# Patient Record
Sex: FEMALE | Race: WHITE | NOT HISPANIC OR LATINO | Employment: PART TIME | URBAN - METROPOLITAN AREA
[De-identification: names, ages, dates, MRNs, and addresses within clinical notes are randomized per-mention and may not be internally consistent; named-entity substitution may affect disease eponyms.]

---

## 2022-12-20 ENCOUNTER — APPOINTMENT (OUTPATIENT)
Dept: RADIOLOGY | Facility: MEDICAL CENTER | Age: 57
DRG: 854 | End: 2022-12-20
Attending: UROLOGY
Payer: OTHER MISCELLANEOUS

## 2022-12-20 ENCOUNTER — HOSPITAL ENCOUNTER (OUTPATIENT)
Dept: RADIOLOGY | Facility: MEDICAL CENTER | Age: 57
End: 2022-12-20

## 2022-12-20 ENCOUNTER — ANESTHESIA (OUTPATIENT)
Dept: SURGERY | Facility: MEDICAL CENTER | Age: 57
DRG: 854 | End: 2022-12-20
Payer: OTHER MISCELLANEOUS

## 2022-12-20 ENCOUNTER — APPOINTMENT (OUTPATIENT)
Dept: RADIOLOGY | Facility: MEDICAL CENTER | Age: 57
DRG: 854 | End: 2022-12-20
Attending: EMERGENCY MEDICINE
Payer: OTHER MISCELLANEOUS

## 2022-12-20 ENCOUNTER — HOSPITAL ENCOUNTER (INPATIENT)
Facility: MEDICAL CENTER | Age: 57
LOS: 1 days | DRG: 854 | End: 2022-12-21
Attending: EMERGENCY MEDICINE | Admitting: STUDENT IN AN ORGANIZED HEALTH CARE EDUCATION/TRAINING PROGRAM
Payer: OTHER MISCELLANEOUS

## 2022-12-20 DIAGNOSIS — N20.0 KIDNEY STONE: ICD-10-CM

## 2022-12-20 DIAGNOSIS — Z95.2 H/O MECHANICAL AORTIC VALVE REPLACEMENT: ICD-10-CM

## 2022-12-20 PROBLEM — N12 PYELONEPHRITIS: Status: ACTIVE | Noted: 2022-12-20

## 2022-12-20 PROBLEM — A41.9 SEPSIS (HCC): Status: RESOLVED | Noted: 2022-12-20 | Resolved: 2022-12-20

## 2022-12-20 PROBLEM — S37.19XA: Status: ACTIVE | Noted: 2022-12-20

## 2022-12-20 PROBLEM — D72.829 LEUKOCYTOSIS: Status: ACTIVE | Noted: 2022-12-20

## 2022-12-20 PROBLEM — N17.9 AKI (ACUTE KIDNEY INJURY) (HCC): Status: ACTIVE | Noted: 2022-12-20

## 2022-12-20 PROBLEM — N13.9 OBSTRUCTIVE UROPATHY: Status: ACTIVE | Noted: 2022-12-20

## 2022-12-20 PROBLEM — I50.9 CONGESTIVE HEART FAILURE (CHF) (HCC): Status: ACTIVE | Noted: 2022-12-20

## 2022-12-20 PROBLEM — A41.9 SEPSIS (HCC): Status: ACTIVE | Noted: 2022-12-20

## 2022-12-20 LAB
ALBUMIN SERPL BCP-MCNC: 4.2 G/DL (ref 3.2–4.9)
ALBUMIN/GLOB SERPL: 1.8 G/DL
ALP SERPL-CCNC: 69 U/L (ref 30–99)
ALT SERPL-CCNC: 18 U/L (ref 2–50)
ANION GAP SERPL CALC-SCNC: 12 MMOL/L (ref 7–16)
APPEARANCE UR: CLEAR
APTT PPP: 160.1 SEC (ref 24.7–36)
APTT PPP: 26.2 SEC (ref 24.7–36)
AST SERPL-CCNC: 23 U/L (ref 12–45)
BACTERIA #/AREA URNS HPF: NEGATIVE /HPF
BASOPHILS # BLD AUTO: 0.2 % (ref 0–1.8)
BASOPHILS # BLD: 0.04 K/UL (ref 0–0.12)
BILIRUB SERPL-MCNC: 0.4 MG/DL (ref 0.1–1.5)
BILIRUB UR QL STRIP.AUTO: NEGATIVE
BUN SERPL-MCNC: 25 MG/DL (ref 8–22)
CALCIUM ALBUM COR SERPL-MCNC: 8.9 MG/DL (ref 8.5–10.5)
CALCIUM SERPL-MCNC: 9.1 MG/DL (ref 8.5–10.5)
CHLORIDE SERPL-SCNC: 105 MMOL/L (ref 96–112)
CO2 SERPL-SCNC: 23 MMOL/L (ref 20–33)
COLOR UR: YELLOW
CREAT SERPL-MCNC: 1.29 MG/DL (ref 0.5–1.4)
CREAT UR-MCNC: 31.28 MG/DL
EKG IMPRESSION: NORMAL
EOSINOPHIL # BLD AUTO: 0.01 K/UL (ref 0–0.51)
EOSINOPHIL NFR BLD: 0.1 % (ref 0–6.9)
EPI CELLS #/AREA URNS HPF: NEGATIVE /HPF
ERYTHROCYTE [DISTWIDTH] IN BLOOD BY AUTOMATED COUNT: 46.7 FL (ref 35.9–50)
GFR SERPLBLD CREATININE-BSD FMLA CKD-EPI: 48 ML/MIN/1.73 M 2
GLOBULIN SER CALC-MCNC: 2.4 G/DL (ref 1.9–3.5)
GLUCOSE SERPL-MCNC: 170 MG/DL (ref 65–99)
GLUCOSE UR STRIP.AUTO-MCNC: NEGATIVE MG/DL
HCT VFR BLD AUTO: 40.5 % (ref 37–47)
HGB BLD-MCNC: 13.7 G/DL (ref 12–16)
HYALINE CASTS #/AREA URNS LPF: ABNORMAL /LPF
IMM GRANULOCYTES # BLD AUTO: 0.14 K/UL (ref 0–0.11)
IMM GRANULOCYTES NFR BLD AUTO: 0.8 % (ref 0–0.9)
INR PPP: 1.37 (ref 0.87–1.13)
INR PPP: 1.49 (ref 0.87–1.13)
KETONES UR STRIP.AUTO-MCNC: NEGATIVE MG/DL
LEUKOCYTE ESTERASE UR QL STRIP.AUTO: NEGATIVE
LYMPHOCYTES # BLD AUTO: 1.23 K/UL (ref 1–4.8)
LYMPHOCYTES NFR BLD: 7.2 % (ref 22–41)
MCH RBC QN AUTO: 31.1 PG (ref 27–33)
MCHC RBC AUTO-ENTMCNC: 33.8 G/DL (ref 33.6–35)
MCV RBC AUTO: 92 FL (ref 81.4–97.8)
MICRO URNS: ABNORMAL
MONOCYTES # BLD AUTO: 0.7 K/UL (ref 0–0.85)
MONOCYTES NFR BLD AUTO: 4.1 % (ref 0–13.4)
NEUTROPHILS # BLD AUTO: 14.91 K/UL (ref 2–7.15)
NEUTROPHILS NFR BLD: 87.6 % (ref 44–72)
NITRITE UR QL STRIP.AUTO: NEGATIVE
NRBC # BLD AUTO: 0 K/UL
NRBC BLD-RTO: 0 /100 WBC
PH UR STRIP.AUTO: 7.5 [PH] (ref 5–8)
PLATELET # BLD AUTO: 179 K/UL (ref 164–446)
PMV BLD AUTO: 9.9 FL (ref 9–12.9)
POTASSIUM SERPL-SCNC: 3.9 MMOL/L (ref 3.6–5.5)
PROCALCITONIN SERPL-MCNC: 0.05 NG/ML
PROT SERPL-MCNC: 6.6 G/DL (ref 6–8.2)
PROT UR QL STRIP: NEGATIVE MG/DL
PROTHROMBIN TIME: 16.6 SEC (ref 12–14.6)
PROTHROMBIN TIME: 17.7 SEC (ref 12–14.6)
RBC # BLD AUTO: 4.4 M/UL (ref 4.2–5.4)
RBC # URNS HPF: ABNORMAL /HPF
RBC UR QL AUTO: ABNORMAL
SODIUM SERPL-SCNC: 140 MMOL/L (ref 135–145)
SODIUM UR-SCNC: 150 MMOL/L
SP GR UR STRIP.AUTO: 1.04
UFH PPP CHRO-ACNC: 0.78 IU/ML
UFH PPP CHRO-ACNC: <0.1 IU/ML
UROBILINOGEN UR STRIP.AUTO-MCNC: 0.2 MG/DL
WBC # BLD AUTO: 17 K/UL (ref 4.8–10.8)
WBC #/AREA URNS HPF: ABNORMAL /HPF

## 2022-12-20 PROCEDURE — 770001 HCHG ROOM/CARE - MED/SURG/GYN PRIV*

## 2022-12-20 PROCEDURE — 84300 ASSAY OF URINE SODIUM: CPT

## 2022-12-20 PROCEDURE — 700105 HCHG RX REV CODE 258: Performed by: STUDENT IN AN ORGANIZED HEALTH CARE EDUCATION/TRAINING PROGRAM

## 2022-12-20 PROCEDURE — 87086 URINE CULTURE/COLONY COUNT: CPT

## 2022-12-20 PROCEDURE — C2617 STENT, NON-COR, TEM W/O DEL: HCPCS | Performed by: UROLOGY

## 2022-12-20 PROCEDURE — 110371 HCHG SHELL REV 272: Performed by: UROLOGY

## 2022-12-20 PROCEDURE — 160035 HCHG PACU - 1ST 60 MINS PHASE I: Performed by: UROLOGY

## 2022-12-20 PROCEDURE — 96375 TX/PRO/DX INJ NEW DRUG ADDON: CPT

## 2022-12-20 PROCEDURE — 700111 HCHG RX REV CODE 636 W/ 250 OVERRIDE (IP): Performed by: ANESTHESIOLOGY

## 2022-12-20 PROCEDURE — C1769 GUIDE WIRE: HCPCS | Performed by: UROLOGY

## 2022-12-20 PROCEDURE — 85730 THROMBOPLASTIN TIME PARTIAL: CPT | Mod: 91

## 2022-12-20 PROCEDURE — 00910 ANES TRANSURETHRAL PX NOS: CPT | Performed by: ANESTHESIOLOGY

## 2022-12-20 PROCEDURE — 84145 PROCALCITONIN (PCT): CPT

## 2022-12-20 PROCEDURE — 82570 ASSAY OF URINE CREATININE: CPT

## 2022-12-20 PROCEDURE — 85520 HEPARIN ASSAY: CPT

## 2022-12-20 PROCEDURE — 160029 HCHG SURGERY MINUTES - 1ST 30 MINS LEVEL 4: Performed by: UROLOGY

## 2022-12-20 PROCEDURE — 99223 1ST HOSP IP/OBS HIGH 75: CPT | Mod: AI | Performed by: STUDENT IN AN ORGANIZED HEALTH CARE EDUCATION/TRAINING PROGRAM

## 2022-12-20 PROCEDURE — 99291 CRITICAL CARE FIRST HOUR: CPT

## 2022-12-20 PROCEDURE — 96366 THER/PROPH/DIAG IV INF ADDON: CPT

## 2022-12-20 PROCEDURE — 85610 PROTHROMBIN TIME: CPT | Mod: 91

## 2022-12-20 PROCEDURE — 96365 THER/PROPH/DIAG IV INF INIT: CPT

## 2022-12-20 PROCEDURE — 700102 HCHG RX REV CODE 250 W/ 637 OVERRIDE(OP): Performed by: STUDENT IN AN ORGANIZED HEALTH CARE EDUCATION/TRAINING PROGRAM

## 2022-12-20 PROCEDURE — 700105 HCHG RX REV CODE 258: Performed by: ANESTHESIOLOGY

## 2022-12-20 PROCEDURE — 700111 HCHG RX REV CODE 636 W/ 250 OVERRIDE (IP): Performed by: STUDENT IN AN ORGANIZED HEALTH CARE EDUCATION/TRAINING PROGRAM

## 2022-12-20 PROCEDURE — 160002 HCHG RECOVERY MINUTES (STAT): Performed by: UROLOGY

## 2022-12-20 PROCEDURE — 74176 CT ABD & PELVIS W/O CONTRAST: CPT

## 2022-12-20 PROCEDURE — 160036 HCHG PACU - EA ADDL 30 MINS PHASE I: Performed by: UROLOGY

## 2022-12-20 PROCEDURE — 36415 COLL VENOUS BLD VENIPUNCTURE: CPT

## 2022-12-20 PROCEDURE — 85025 COMPLETE CBC W/AUTO DIFF WBC: CPT | Mod: 91

## 2022-12-20 PROCEDURE — 93005 ELECTROCARDIOGRAM TRACING: CPT

## 2022-12-20 PROCEDURE — 81001 URINALYSIS AUTO W/SCOPE: CPT

## 2022-12-20 PROCEDURE — 0T768DZ DILATION OF RIGHT URETER WITH INTRALUMINAL DEVICE, VIA NATURAL OR ARTIFICIAL OPENING ENDOSCOPIC: ICD-10-PCS | Performed by: UROLOGY

## 2022-12-20 PROCEDURE — 700111 HCHG RX REV CODE 636 W/ 250 OVERRIDE (IP): Performed by: EMERGENCY MEDICINE

## 2022-12-20 PROCEDURE — 700101 HCHG RX REV CODE 250: Performed by: ANESTHESIOLOGY

## 2022-12-20 PROCEDURE — A9270 NON-COVERED ITEM OR SERVICE: HCPCS | Performed by: STUDENT IN AN ORGANIZED HEALTH CARE EDUCATION/TRAINING PROGRAM

## 2022-12-20 PROCEDURE — 700117 HCHG RX CONTRAST REV CODE 255: Performed by: UROLOGY

## 2022-12-20 PROCEDURE — 87040 BLOOD CULTURE FOR BACTERIA: CPT | Mod: 91

## 2022-12-20 PROCEDURE — 96376 TX/PRO/DX INJ SAME DRUG ADON: CPT

## 2022-12-20 PROCEDURE — 80053 COMPREHEN METABOLIC PANEL: CPT | Mod: 91

## 2022-12-20 PROCEDURE — 700101 HCHG RX REV CODE 250: Performed by: STUDENT IN AN ORGANIZED HEALTH CARE EDUCATION/TRAINING PROGRAM

## 2022-12-20 PROCEDURE — 160048 HCHG OR STATISTICAL LEVEL 1-5: Performed by: UROLOGY

## 2022-12-20 PROCEDURE — 160009 HCHG ANES TIME/MIN: Performed by: UROLOGY

## 2022-12-20 PROCEDURE — 94760 N-INVAS EAR/PLS OXIMETRY 1: CPT

## 2022-12-20 DEVICE — STENT UROLOGICAL POLARIS 6X24  ULTRA: Type: IMPLANTABLE DEVICE | Site: URETER | Status: FUNCTIONAL

## 2022-12-20 RX ORDER — PANTOPRAZOLE SODIUM 40 MG/1
40 TABLET, DELAYED RELEASE ORAL DAILY
COMMUNITY

## 2022-12-20 RX ORDER — ONDANSETRON 2 MG/ML
INJECTION INTRAMUSCULAR; INTRAVENOUS PRN
Status: DISCONTINUED | OUTPATIENT
Start: 2022-12-20 | End: 2022-12-20 | Stop reason: SURG

## 2022-12-20 RX ORDER — HALOPERIDOL 5 MG/ML
1 INJECTION INTRAMUSCULAR
Status: DISCONTINUED | OUTPATIENT
Start: 2022-12-20 | End: 2022-12-20 | Stop reason: HOSPADM

## 2022-12-20 RX ORDER — HYDROMORPHONE HYDROCHLORIDE 1 MG/ML
1 INJECTION, SOLUTION INTRAMUSCULAR; INTRAVENOUS; SUBCUTANEOUS ONCE
Status: COMPLETED | OUTPATIENT
Start: 2022-12-20 | End: 2022-12-20

## 2022-12-20 RX ORDER — SPIRONOLACTONE 50 MG/1
50 TABLET, FILM COATED ORAL DAILY
Status: DISCONTINUED | OUTPATIENT
Start: 2022-12-20 | End: 2022-12-21 | Stop reason: HOSPADM

## 2022-12-20 RX ORDER — ONDANSETRON 2 MG/ML
4 INJECTION INTRAMUSCULAR; INTRAVENOUS ONCE
Status: COMPLETED | OUTPATIENT
Start: 2022-12-20 | End: 2022-12-20

## 2022-12-20 RX ORDER — OXYCODONE HCL 5 MG/5 ML
5 SOLUTION, ORAL ORAL
Status: DISCONTINUED | OUTPATIENT
Start: 2022-12-20 | End: 2022-12-20 | Stop reason: HOSPADM

## 2022-12-20 RX ORDER — ROSUVASTATIN CALCIUM 40 MG/1
40 TABLET, COATED ORAL DAILY
COMMUNITY

## 2022-12-20 RX ORDER — PROMETHAZINE HYDROCHLORIDE 25 MG/1
12.5-25 TABLET ORAL EVERY 4 HOURS PRN
Status: DISCONTINUED | OUTPATIENT
Start: 2022-12-20 | End: 2022-12-21 | Stop reason: HOSPADM

## 2022-12-20 RX ORDER — HEPARIN SODIUM 1000 [USP'U]/ML
40 INJECTION, SOLUTION INTRAVENOUS; SUBCUTANEOUS PRN
Status: DISCONTINUED | OUTPATIENT
Start: 2022-12-20 | End: 2022-12-21

## 2022-12-20 RX ORDER — POLYETHYLENE GLYCOL 3350 17 G/17G
1 POWDER, FOR SOLUTION ORAL
Status: DISCONTINUED | OUTPATIENT
Start: 2022-12-20 | End: 2022-12-21 | Stop reason: HOSPADM

## 2022-12-20 RX ORDER — MIDAZOLAM HYDROCHLORIDE 1 MG/ML
INJECTION INTRAMUSCULAR; INTRAVENOUS PRN
Status: DISCONTINUED | OUTPATIENT
Start: 2022-12-20 | End: 2022-12-20 | Stop reason: SURG

## 2022-12-20 RX ORDER — ROSUVASTATIN CALCIUM 20 MG/1
40 TABLET, COATED ORAL EVERY EVENING
Status: DISCONTINUED | OUTPATIENT
Start: 2022-12-20 | End: 2022-12-21 | Stop reason: HOSPADM

## 2022-12-20 RX ORDER — AMOXICILLIN 250 MG
2 CAPSULE ORAL 2 TIMES DAILY
Status: DISCONTINUED | OUTPATIENT
Start: 2022-12-20 | End: 2022-12-21 | Stop reason: HOSPADM

## 2022-12-20 RX ORDER — CEFAZOLIN SODIUM 1 G/3ML
INJECTION, POWDER, FOR SOLUTION INTRAMUSCULAR; INTRAVENOUS PRN
Status: DISCONTINUED | OUTPATIENT
Start: 2022-12-20 | End: 2022-12-20 | Stop reason: SURG

## 2022-12-20 RX ORDER — ONDANSETRON 2 MG/ML
4 INJECTION INTRAMUSCULAR; INTRAVENOUS
Status: DISCONTINUED | OUTPATIENT
Start: 2022-12-20 | End: 2022-12-20 | Stop reason: HOSPADM

## 2022-12-20 RX ORDER — GUAIFENESIN/DEXTROMETHORPHAN 100-10MG/5
10 SYRUP ORAL EVERY 6 HOURS PRN
Status: DISCONTINUED | OUTPATIENT
Start: 2022-12-20 | End: 2022-12-21 | Stop reason: HOSPADM

## 2022-12-20 RX ORDER — SODIUM CHLORIDE, SODIUM LACTATE, POTASSIUM CHLORIDE, AND CALCIUM CHLORIDE .6; .31; .03; .02 G/100ML; G/100ML; G/100ML; G/100ML
500 INJECTION, SOLUTION INTRAVENOUS
Status: DISCONTINUED | OUTPATIENT
Start: 2022-12-20 | End: 2022-12-21 | Stop reason: HOSPADM

## 2022-12-20 RX ORDER — OXYCODONE HCL 5 MG/5 ML
10 SOLUTION, ORAL ORAL
Status: DISCONTINUED | OUTPATIENT
Start: 2022-12-20 | End: 2022-12-20 | Stop reason: HOSPADM

## 2022-12-20 RX ORDER — SPIRONOLACTONE 50 MG/1
50 TABLET, FILM COATED ORAL DAILY
COMMUNITY

## 2022-12-20 RX ORDER — HYDRALAZINE HYDROCHLORIDE 20 MG/ML
5 INJECTION INTRAMUSCULAR; INTRAVENOUS
Status: DISCONTINUED | OUTPATIENT
Start: 2022-12-20 | End: 2022-12-20 | Stop reason: HOSPADM

## 2022-12-20 RX ORDER — LIDOCAINE 50 MG/G
1 PATCH TOPICAL EVERY 24 HOURS
Status: DISCONTINUED | OUTPATIENT
Start: 2022-12-20 | End: 2022-12-21 | Stop reason: HOSPADM

## 2022-12-20 RX ORDER — LABETALOL HYDROCHLORIDE 5 MG/ML
10 INJECTION, SOLUTION INTRAVENOUS EVERY 4 HOURS PRN
Status: DISCONTINUED | OUTPATIENT
Start: 2022-12-20 | End: 2022-12-21 | Stop reason: HOSPADM

## 2022-12-20 RX ORDER — OXYCODONE HYDROCHLORIDE 10 MG/1
10 TABLET ORAL
Status: DISCONTINUED | OUTPATIENT
Start: 2022-12-20 | End: 2022-12-21 | Stop reason: HOSPADM

## 2022-12-20 RX ORDER — CEFTRIAXONE 1 G/1
1000 INJECTION, POWDER, FOR SOLUTION INTRAMUSCULAR; INTRAVENOUS ONCE
Status: DISCONTINUED | OUTPATIENT
Start: 2022-12-20 | End: 2022-12-20

## 2022-12-20 RX ORDER — ACETAMINOPHEN 500 MG
1000 TABLET ORAL EVERY 6 HOURS PRN
Status: DISCONTINUED | OUTPATIENT
Start: 2022-12-25 | End: 2022-12-21 | Stop reason: HOSPADM

## 2022-12-20 RX ORDER — BISOPROLOL FUMARATE 10 MG/1
10 TABLET, FILM COATED ORAL DAILY
COMMUNITY

## 2022-12-20 RX ORDER — HYDROMORPHONE HYDROCHLORIDE 1 MG/ML
0.5 INJECTION, SOLUTION INTRAMUSCULAR; INTRAVENOUS; SUBCUTANEOUS
Status: DISCONTINUED | OUTPATIENT
Start: 2022-12-20 | End: 2022-12-21 | Stop reason: HOSPADM

## 2022-12-20 RX ORDER — PROMETHAZINE HYDROCHLORIDE 25 MG/1
12.5-25 SUPPOSITORY RECTAL EVERY 4 HOURS PRN
Status: DISCONTINUED | OUTPATIENT
Start: 2022-12-20 | End: 2022-12-21 | Stop reason: HOSPADM

## 2022-12-20 RX ORDER — LIDOCAINE HYDROCHLORIDE 20 MG/ML
INJECTION, SOLUTION EPIDURAL; INFILTRATION; INTRACAUDAL; PERINEURAL PRN
Status: DISCONTINUED | OUTPATIENT
Start: 2022-12-20 | End: 2022-12-20 | Stop reason: SURG

## 2022-12-20 RX ORDER — ACETAMINOPHEN 500 MG
1000 TABLET ORAL EVERY 6 HOURS
Status: DISCONTINUED | OUTPATIENT
Start: 2022-12-20 | End: 2022-12-21 | Stop reason: HOSPADM

## 2022-12-20 RX ORDER — BISACODYL 10 MG
10 SUPPOSITORY, RECTAL RECTAL
Status: DISCONTINUED | OUTPATIENT
Start: 2022-12-20 | End: 2022-12-21 | Stop reason: HOSPADM

## 2022-12-20 RX ORDER — PROCHLORPERAZINE EDISYLATE 5 MG/ML
5-10 INJECTION INTRAMUSCULAR; INTRAVENOUS EVERY 4 HOURS PRN
Status: DISCONTINUED | OUTPATIENT
Start: 2022-12-20 | End: 2022-12-21 | Stop reason: HOSPADM

## 2022-12-20 RX ORDER — WARFARIN SODIUM 4 MG/1
8 TABLET ORAL
Status: DISCONTINUED | OUTPATIENT
Start: 2022-12-24 | End: 2022-12-21

## 2022-12-20 RX ORDER — TAMSULOSIN HYDROCHLORIDE 0.4 MG/1
0.4 CAPSULE ORAL 2 TIMES DAILY
Status: DISCONTINUED | OUTPATIENT
Start: 2022-12-20 | End: 2022-12-21 | Stop reason: HOSPADM

## 2022-12-20 RX ORDER — HEPARIN SODIUM 5000 [USP'U]/100ML
0-30 INJECTION, SOLUTION INTRAVENOUS CONTINUOUS
Status: DISCONTINUED | OUTPATIENT
Start: 2022-12-20 | End: 2022-12-21

## 2022-12-20 RX ORDER — DIPHENHYDRAMINE HYDROCHLORIDE 50 MG/ML
12.5 INJECTION INTRAMUSCULAR; INTRAVENOUS
Status: DISCONTINUED | OUTPATIENT
Start: 2022-12-20 | End: 2022-12-20 | Stop reason: HOSPADM

## 2022-12-20 RX ORDER — BISOPROLOL FUMARATE 5 MG/1
10 TABLET, FILM COATED ORAL DAILY
Status: DISCONTINUED | OUTPATIENT
Start: 2022-12-20 | End: 2022-12-21 | Stop reason: HOSPADM

## 2022-12-20 RX ORDER — RAMIPRIL 10 MG/1
10 CAPSULE ORAL 2 TIMES DAILY
COMMUNITY

## 2022-12-20 RX ORDER — HYDROMORPHONE HYDROCHLORIDE 1 MG/ML
0.2 INJECTION, SOLUTION INTRAMUSCULAR; INTRAVENOUS; SUBCUTANEOUS
Status: DISCONTINUED | OUTPATIENT
Start: 2022-12-20 | End: 2022-12-20 | Stop reason: HOSPADM

## 2022-12-20 RX ORDER — HYDROMORPHONE HYDROCHLORIDE 1 MG/ML
0.4 INJECTION, SOLUTION INTRAMUSCULAR; INTRAVENOUS; SUBCUTANEOUS
Status: DISCONTINUED | OUTPATIENT
Start: 2022-12-20 | End: 2022-12-20 | Stop reason: HOSPADM

## 2022-12-20 RX ORDER — SODIUM CHLORIDE 9 MG/ML
INJECTION, SOLUTION INTRAVENOUS CONTINUOUS
Status: ACTIVE | OUTPATIENT
Start: 2022-12-20 | End: 2022-12-20

## 2022-12-20 RX ORDER — HYDROMORPHONE HYDROCHLORIDE 1 MG/ML
0.1 INJECTION, SOLUTION INTRAMUSCULAR; INTRAVENOUS; SUBCUTANEOUS
Status: DISCONTINUED | OUTPATIENT
Start: 2022-12-20 | End: 2022-12-20 | Stop reason: HOSPADM

## 2022-12-20 RX ORDER — SODIUM CHLORIDE, SODIUM LACTATE, POTASSIUM CHLORIDE, CALCIUM CHLORIDE 600; 310; 30; 20 MG/100ML; MG/100ML; MG/100ML; MG/100ML
INJECTION, SOLUTION INTRAVENOUS
Status: DISCONTINUED | OUTPATIENT
Start: 2022-12-20 | End: 2022-12-20 | Stop reason: SURG

## 2022-12-20 RX ORDER — MIDAZOLAM HYDROCHLORIDE 1 MG/ML
1 INJECTION INTRAMUSCULAR; INTRAVENOUS
Status: DISCONTINUED | OUTPATIENT
Start: 2022-12-20 | End: 2022-12-20 | Stop reason: HOSPADM

## 2022-12-20 RX ORDER — DEXAMETHASONE SODIUM PHOSPHATE 4 MG/ML
INJECTION, SOLUTION INTRA-ARTICULAR; INTRALESIONAL; INTRAMUSCULAR; INTRAVENOUS; SOFT TISSUE PRN
Status: DISCONTINUED | OUTPATIENT
Start: 2022-12-20 | End: 2022-12-20 | Stop reason: SURG

## 2022-12-20 RX ORDER — ACETAMINOPHEN 325 MG/1
650 TABLET ORAL EVERY 6 HOURS PRN
Status: DISCONTINUED | OUTPATIENT
Start: 2022-12-20 | End: 2022-12-20

## 2022-12-20 RX ORDER — OXYCODONE HYDROCHLORIDE 5 MG/1
5 TABLET ORAL
Status: DISCONTINUED | OUTPATIENT
Start: 2022-12-20 | End: 2022-12-21 | Stop reason: HOSPADM

## 2022-12-20 RX ORDER — OMEPRAZOLE 20 MG/1
20 CAPSULE, DELAYED RELEASE ORAL DAILY
Status: DISCONTINUED | OUTPATIENT
Start: 2022-12-20 | End: 2022-12-21 | Stop reason: HOSPADM

## 2022-12-20 RX ORDER — ONDANSETRON 4 MG/1
4 TABLET, ORALLY DISINTEGRATING ORAL EVERY 4 HOURS PRN
Status: DISCONTINUED | OUTPATIENT
Start: 2022-12-20 | End: 2022-12-21 | Stop reason: HOSPADM

## 2022-12-20 RX ORDER — RAMIPRIL 5 MG/1
10 CAPSULE ORAL 2 TIMES DAILY
Status: DISCONTINUED | OUTPATIENT
Start: 2022-12-20 | End: 2022-12-21 | Stop reason: HOSPADM

## 2022-12-20 RX ORDER — HEPARIN SODIUM 5000 [USP'U]/100ML
0-30 INJECTION, SOLUTION INTRAVENOUS CONTINUOUS
Status: DISCONTINUED | OUTPATIENT
Start: 2022-12-20 | End: 2022-12-20

## 2022-12-20 RX ORDER — ONDANSETRON 2 MG/ML
4 INJECTION INTRAMUSCULAR; INTRAVENOUS EVERY 4 HOURS PRN
Status: DISCONTINUED | OUTPATIENT
Start: 2022-12-20 | End: 2022-12-21 | Stop reason: HOSPADM

## 2022-12-20 RX ORDER — LABETALOL HYDROCHLORIDE 5 MG/ML
5 INJECTION, SOLUTION INTRAVENOUS
Status: DISCONTINUED | OUTPATIENT
Start: 2022-12-20 | End: 2022-12-20 | Stop reason: HOSPADM

## 2022-12-20 RX ADMIN — ACETAMINOPHEN 1000 MG: 500 TABLET ORAL at 19:16

## 2022-12-20 RX ADMIN — ONDANSETRON 4 MG: 2 INJECTION INTRAMUSCULAR; INTRAVENOUS at 03:33

## 2022-12-20 RX ADMIN — WARFARIN SODIUM 7 MG: 6 TABLET ORAL at 23:35

## 2022-12-20 RX ADMIN — LIDOCAINE HYDROCHLORIDE 100 MG: 20 INJECTION, SOLUTION EPIDURAL; INFILTRATION; INTRACAUDAL at 14:43

## 2022-12-20 RX ADMIN — ONDANSETRON 4 MG: 2 INJECTION INTRAMUSCULAR; INTRAVENOUS at 14:59

## 2022-12-20 RX ADMIN — HYDROMORPHONE HYDROCHLORIDE 1 MG: 1 INJECTION, SOLUTION INTRAMUSCULAR; INTRAVENOUS; SUBCUTANEOUS at 03:33

## 2022-12-20 RX ADMIN — DEXAMETHASONE SODIUM PHOSPHATE 6 MG: 4 INJECTION, SOLUTION INTRA-ARTICULAR; INTRALESIONAL; INTRAMUSCULAR; INTRAVENOUS; SOFT TISSUE at 14:43

## 2022-12-20 RX ADMIN — HEPARIN SODIUM 18 UNITS/KG/HR: 5000 INJECTION, SOLUTION INTRAVENOUS at 10:52

## 2022-12-20 RX ADMIN — ROSUVASTATIN CALCIUM 40 MG: 20 TABLET, FILM COATED ORAL at 22:06

## 2022-12-20 RX ADMIN — TAMSULOSIN HYDROCHLORIDE 0.4 MG: 0.4 CAPSULE ORAL at 22:06

## 2022-12-20 RX ADMIN — RAMIPRIL 10 MG: 5 CAPSULE ORAL at 22:07

## 2022-12-20 RX ADMIN — SENNOSIDES AND DOCUSATE SODIUM 2 TABLET: 50; 8.6 TABLET ORAL at 22:06

## 2022-12-20 RX ADMIN — ACETAMINOPHEN 1000 MG: 500 TABLET ORAL at 09:04

## 2022-12-20 RX ADMIN — FENTANYL CITRATE 50 MCG: 50 INJECTION, SOLUTION INTRAMUSCULAR; INTRAVENOUS at 14:48

## 2022-12-20 RX ADMIN — LIDOCAINE 1 PATCH: 700 PATCH TOPICAL at 05:57

## 2022-12-20 RX ADMIN — HEPARIN SODIUM 18 UNITS/KG/HR: 5000 INJECTION, SOLUTION INTRAVENOUS at 06:45

## 2022-12-20 RX ADMIN — SODIUM CHLORIDE, POTASSIUM CHLORIDE, SODIUM LACTATE AND CALCIUM CHLORIDE: 600; 310; 30; 20 INJECTION, SOLUTION INTRAVENOUS at 14:40

## 2022-12-20 RX ADMIN — SODIUM CHLORIDE: 9 INJECTION, SOLUTION INTRAVENOUS at 05:52

## 2022-12-20 RX ADMIN — PROPOFOL 150 MG: 10 INJECTION, EMULSION INTRAVENOUS at 14:43

## 2022-12-20 RX ADMIN — MIDAZOLAM HYDROCHLORIDE 2 MG: 1 INJECTION, SOLUTION INTRAMUSCULAR; INTRAVENOUS at 14:40

## 2022-12-20 RX ADMIN — ONDANSETRON 4 MG: 2 INJECTION INTRAMUSCULAR; INTRAVENOUS at 05:44

## 2022-12-20 RX ADMIN — HYDROMORPHONE HYDROCHLORIDE 0.5 MG: 1 INJECTION, SOLUTION INTRAMUSCULAR; INTRAVENOUS; SUBCUTANEOUS at 05:45

## 2022-12-20 RX ADMIN — CEFAZOLIN 2 G: 330 INJECTION, POWDER, FOR SOLUTION INTRAMUSCULAR; INTRAVENOUS at 14:40

## 2022-12-20 RX ADMIN — CEFTRIAXONE SODIUM 1000 MG: 1 INJECTION, POWDER, FOR SOLUTION INTRAMUSCULAR; INTRAVENOUS at 05:47

## 2022-12-20 ASSESSMENT — COGNITIVE AND FUNCTIONAL STATUS - GENERAL
MOBILITY SCORE: 24
DAILY ACTIVITIY SCORE: 24
SUGGESTED CMS G CODE MODIFIER DAILY ACTIVITY: CH
SUGGESTED CMS G CODE MODIFIER MOBILITY: CH

## 2022-12-20 ASSESSMENT — LIFESTYLE VARIABLES
ON A TYPICAL DAY WHEN YOU DRINK ALCOHOL HOW MANY DRINKS DO YOU HAVE: 0
EVER HAD A DRINK FIRST THING IN THE MORNING TO STEADY YOUR NERVES TO GET RID OF A HANGOVER: NO
TOTAL SCORE: 0
AVERAGE NUMBER OF DAYS PER WEEK YOU HAVE A DRINK CONTAINING ALCOHOL: 0
ALCOHOL_USE: NO
TOTAL SCORE: 0
TOTAL SCORE: 0
SUBSTANCE_ABUSE: 0
CONSUMPTION TOTAL: NEGATIVE
DOES PATIENT WANT TO STOP DRINKING: NO
EVER FELT BAD OR GUILTY ABOUT YOUR DRINKING: NO
HAVE YOU EVER FELT YOU SHOULD CUT DOWN ON YOUR DRINKING: NO
HOW MANY TIMES IN THE PAST YEAR HAVE YOU HAD 5 OR MORE DRINKS IN A DAY: 0
HAVE PEOPLE ANNOYED YOU BY CRITICIZING YOUR DRINKING: NO

## 2022-12-20 ASSESSMENT — ENCOUNTER SYMPTOMS
DIZZINESS: 0
COUGH: 0
DEPRESSION: 0
HEARTBURN: 0
VOMITING: 1
FLANK PAIN: 1
BACK PAIN: 1
CHILLS: 1
HEADACHES: 0
ABDOMINAL PAIN: 1
FEVER: 0
MYALGIAS: 0
NAUSEA: 1
PALPITATIONS: 0
DOUBLE VISION: 0
SHORTNESS OF BREATH: 0
BLURRED VISION: 0
WEAKNESS: 1
BRUISES/BLEEDS EASILY: 0

## 2022-12-20 ASSESSMENT — PATIENT HEALTH QUESTIONNAIRE - PHQ9
SUM OF ALL RESPONSES TO PHQ9 QUESTIONS 1 AND 2: 0
2. FEELING DOWN, DEPRESSED, IRRITABLE, OR HOPELESS: NOT AT ALL
1. LITTLE INTEREST OR PLEASURE IN DOING THINGS: NOT AT ALL

## 2022-12-20 ASSESSMENT — FIBROSIS 4 INDEX
FIB4 SCORE: 1.14
FIB4 SCORE: 1.76
FIB4 SCORE: 1.76

## 2022-12-20 ASSESSMENT — PAIN DESCRIPTION - PAIN TYPE: TYPE: ACUTE PAIN

## 2022-12-20 ASSESSMENT — PAIN SCALES - GENERAL: PAIN_LEVEL: 0

## 2022-12-20 NOTE — ASSESSMENT & PLAN NOTE
S/p mechanical AVR 2018 per pt  Was on warfarin -- subtherapeutic on admission   Currently on heparin ggt, continue, resume warfarin post operatively if cleared by urology

## 2022-12-20 NOTE — ASSESSMENT & PLAN NOTE
Likely reactive from acute renal stone   Possible rupture on CT thus will continue with IV antibiotics however UA is negative consider discontinuing pending urology evaluation/procedure findings   Continue IVF   Trend  Follow up cultures

## 2022-12-20 NOTE — ASSESSMENT & PLAN NOTE
+CVA tenderness with right sided obstructive uropathy, concern for pyelo   UA neg, continue with rocephin for now given concern for rupture   Follow up urine and blood Cx

## 2022-12-20 NOTE — ED NOTES
Med Rec completed per patient and    Allergies reviewed  No ORAL antibiotics in last 30 days    Patient takes Warfarin   7 mg on Monday-Friday   8 mg on Saturday and Sunday

## 2022-12-20 NOTE — PROGRESS NOTES
Hospital Medicine Daily Progress Note    Date of Service  12/20/2022    Chief Complaint  Emma Gonsalez is a 57 y.o. female admitted 12/20/2022 with right sided abdominal/flank pain with radiation to groin     Hospital Course  Emma Gonsalez is a 57 y.o. female with history of mechanical aortic valve replacement 2018 on warfarin, hypertension, history of congestive heart failure (EF 20% ->45%) who presented 12/20/2022 with evaluation for abdominal/flank pain where she was found to have a right 5 mm obstructing UVJ stone with right hydronephrosis and suspected ureteral rupture.  [place] [facility] [place] [facility] for urological evaluation.  Patient reported having excruciating sudden onset right groin pain, radiating to back.   Repeat CT AP/urethrogram noted changes of right hydronephrosis with contrast leakage into perinephric space tracking into right retroperitoneum, likely from anterior interpolar calyx.  Case was discussed with urology, tentative plan for invasive intervention thus she was admitted for further evaluation and treatment.     Interval Problem Update  Pt seen and examined, SO at bedside. C/o right flank pain with radiation to groin associated with nausea/vomiting  - vitals stable, labs stable, elevated WBC   - continue rocephin for now given elevated WBC, obstruction and suspected ureteral rupture, urine and blood Cx pending   - urology following, OR today   - pt placed on heparin ggt for bridging (AV valve) holding warfarin, resume post op, okay to continue heparin ggt per urology   - supportive care with pain control/anti-emetics     I have discussed this patient's plan of care and discharge plan at IDT rounds today with Case Management, Nursing, Nursing leadership, and other members of the IDT team.    Consultants/Specialty  urology    Code Status  Full Code    Disposition  Patient is not medically cleared for discharge.   Anticipate discharge to to home with close outpatient  follow-up.  I have placed the appropriate orders for post-discharge needs.    Review of Systems  ROS     Physical Exam  Temp:  [35.9 °C (96.7 °F)-36.4 °C (97.6 °F)] 35.9 °C (96.7 °F)  Pulse:  [60-98] 60  Resp:  [13-18] 16  BP: (123-189)/() 123/67  SpO2:  [92 %-99 %] 94 %    Physical Exam    Fluids  No intake or output data in the 24 hours ending 12/20/22 1450    Laboratory  Recent Labs     12/20/22  0040 12/20/22  0240   WBC 12.0* 17.0*   RBC 4.25 4.40   HEMOGLOBIN 13.4 13.7   HEMATOCRIT 39.1 40.5   MCV 92.0 92.0   MCH 31.5* 31.1   MCHC 34.3 33.8   RDW 13.7 46.7   PLATELETCT 194 179   MPV 10.4 9.9     Recent Labs     12/20/22  0040 12/20/22  0240   SODIUM 141 140   POTASSIUM 3.4* 3.9   CHLORIDE 102 105   CO2 26 23   GLUCOSE 196* 170*   BUN 28* 25*   CREATININE 1.3* 1.29   CALCIUM 9.1 9.1     Recent Labs     12/20/22  0040 12/20/22  0540   APTT 23.6 26.2   INR 1.28 1.37*               Imaging  OUTSIDE IMAGES-CT ABDOMEN /PELVIS   Final Result      CT-ABDOMEN-PELVIS W/O   Final Result         1.  Changes of right hydronephrosis with contrast leakage into the perinephric space tracking into the right retroperitoneum, likely from anterior interpolar calyx.   2.  Hazy bilateral lower lobe infiltrates.   3.  Right lung base granuloma   4.  3.0 cm fusiform distal abdominal aortic aneurysm, radiographic follow-up and surveillance recommended as clinically appropriate.      These findings were discussed with the patient's clinician, Alton Abernathy, on 12/20/2022 4:42 AM.         DX-CYSTO FLUORO < 1 HOUR    (Results Pending)        Assessment/Plan  * Obstructive uropathy  Assessment & Plan  Noted to have 5 mm UVJ stone with obstructive hydronephrosis and possible ureteral rupture   Continue IVF  Supportive care with pain control and antiemetics   Continue started Flomax  Urology consulted, cystoscopy today with potential stent placement     Congestive heart failure (CHF) (HCC)- (present on admission)  Assessment &  Plan  CHF, at baseline, not in acute exacerbation   - resume home cardiac medications   - monitor renal function/electrolytes     Pyelonephritis  Assessment & Plan  +CVA tenderness with right sided obstructive uropathy, concern for pyelo   UA neg, continue with rocephin for now given concern for rupture   Follow up urine and blood Cx    Leukocytosis  Assessment & Plan  Likely reactive from acute renal stone   Possible rupture on CT thus will continue with IV antibiotics however UA is negative consider discontinuing pending urology evaluation/procedure findings   Continue IVF   Trend  Follow up cultures     H/O mechanical aortic valve replacement  Assessment & Plan  S/p mechanical AVR 2018 per pt  Was on warfarin -- subtherapeutic on admission   Currently on heparin ggt, continue, resume warfarin post operatively if cleared by urology     RENATA (acute kidney injury) (HCC)  Assessment & Plan  Mild bump in renal function, Secondary to obstructive uropathy  Continue IVF    Rupture of ureter- (present on admission)  Assessment & Plan  Suspected right-sided ureteral rupture  Thought to be secondary to obstructive nephrolithiasis    Supportive pain control  Urology consulted, tentative plan for intervention  Continue IV antibiotics for now        VTE prophylaxis: therapeutic anticoagulation with warfarin    I have performed a physical exam and reviewed and updated ROS and Plan today (12/20/2022). In review of yesterday's note (12/19/2022), there are no changes except as documented above.

## 2022-12-20 NOTE — ED TRIAGE NOTES
Chief Complaint   Patient presents with   • Abdominal Pain     Pt had RLQ pain and went [place] [facility] where they found a 5mm stone in right kidney, pt denies hx of kidney stones in the past     Pt transferred from [facility] via ambulance for above complaint.  Pt has PIV and received the following medications prior to arrival:  Dilaudid 1mg  Fentanyl 50 mcg  Morphine 4mg   Zofran 4mg  1L NS bolus    Pt also placed on 2L O2 via NC en route for borderline low SpO2 s/t pain medications.

## 2022-12-20 NOTE — ANESTHESIA POSTPROCEDURE EVALUATION
Patient: Emma Gonsalez    Procedure Summary     Date: 12/20/22 Room / Location: [facility] 09 / SURGERY [facility]    Anesthesia Start: 1440 Anesthesia Stop: 1513    Procedures:       CYSTOSCOPY, WITH URETERAL STENT INSERTION (Right)      URETEROSCOPY (Right)      LITHOTRIPSY, USING LASER (Right) Diagnosis: ( right sided abdominal/flank pain with radiation to groin )    Surgeons: Oleksandr Whitman M.D. Responsible Provider: Ally Harris M.D.    Anesthesia Type: general ASA Status: 2          Final Anesthesia Type: general  Last vitals  BP   Blood Pressure: 123/67    Temp   35.9 °C (96.7 °F)    Pulse   60   Resp   16    SpO2   94 %      Anesthesia Post Evaluation    Patient location during evaluation: PACU  Patient participation: complete - patient participated  Level of consciousness: awake  Pain score: 0    Airway patency: patent  Anesthetic complications: no  Cardiovascular status: adequate  Respiratory status: acceptable  Hydration status: acceptable    PONV: none          No notable events documented.     Nurse Pain Score: 3 (NPRS)

## 2022-12-20 NOTE — OR NURSING
Pt arrived via gurney from OR w/ RN and anesthesia. VSS. Report received. Orders reviewed and initiated. OPA in place.   0

## 2022-12-20 NOTE — ED NOTES
CT Tech at bedside to take pt for scan but pt had questions about the test so ERP is now at bedside explaining process to pt before she gets CT scan done.

## 2022-12-20 NOTE — PROGRESS NOTES
Urology Pre-op Note  56 yo F with intractable pain from 5mm R UVJ stone.    Plan: OR for R ureteroscopy with laser litho and possible stent.

## 2022-12-20 NOTE — OR SURGEON
Immediate Post OP Note    PreOp Diagnosis: R ureteral stone    PostOp Diagnosis: R ureteral stricture    Procedure(s):  CYSTOSCOPY, WITH URETERAL STENT INSERTION - Wound Class: Clean Contaminated  URETEROSCOPY - Wound Class: Clean Contaminated  URETERAL DILATION    Surgeon(s):  Oleksandr Whitman M.D.    Anesthesiologist/Type of Anesthesia:  Anesthesiologist: Ally Harris M.D./General    Surgical Staff:  Circulator: Jigar Nolan R.N.; Susana Nance R.N.  Laser Staff: Gorge Reza Circulator: Maryanne Hodge R.N.  Scrub Person: Dago Cordon  Radiology Technologist: Marta Guy    Specimens removed if any:  * No specimens in log *    Estimated Blood Loss: 5ml    Findings: R UVJ stricture - no stone seen - stricture dilated and stent placed    Complications: none    Drain: [id number] R ureteral stent with string    12/20/2022 3:08 PM Oleksandr Whitman M.D.

## 2022-12-20 NOTE — ED PROVIDER NOTES
ER Provider Note    Scribed for Alton Abernathy by Carmelita Stark. 12/20/2022  3:25 AM    Primary Care Provider: Pcp Pt States None  Means of Arrival: EMS transfer  History obtained from: Patient    CHIEF COMPLAINT  Chief Complaint   Patient presents with    Abdominal Pain     Pt had RLQ pain and [place] [facility] [place] 5mm stone in right kidney, pt denies hx of kidney stones in the past     LIMITATION TO HISTORY   Select: : None    HPI  Emma Gonsalez is a 57 y.o. female who presents to the ED, via EMS [place] [facility], complaining of right flank pain onset 5 hours ago. The pain radiates into the right lower quadrant, and she has associated hematuria. Outside facility found a 5mm kidney stone with possible ureteral rupture noted. She has no prior history of kidney stones. She is compliant with Warafin daily secondary to a Heart Valve replacement in 2018. She is currently nauseas, but denies any fevers.     OUTSIDE HISTORIAN(S):  EMS    EXTERNAL RECORDS REVIEWED  Select: Inpatient Notes  , EMS run sheet  , and External ED Note Patient with 5 mm kidney stone and possible ureteral rupture secondary to obstructed stone    REVIEW OF SYSTEMS  Pertinent positives include right flank pain,  RLQ pain, nausea, hematuria. Pertinent negatives include no fever.  All other systems reviewed and negative.     PAST MEDICAL HISTORY  Past Medical History:   Diagnosis Date    Hypertension        SURGICAL HISTORY  None noted on chart review    FAMILY HISTORY  None noted on chart review    SOCIAL HISTORY   reports that she has never smoked. She has never used smokeless tobacco. She reports that she does not currently use alcohol. She reports that she does not use drugs.    CURRENT MEDICATIONS  Previous Medications    WARFARIN (COUMADIN) 2 MG TAB    Take  by mouth every day. Indications: valve replacement       ALLERGIES  Patient has no known allergies.    PHYSICAL EXAM  Constitutional: Well  developed, Well nourished, No acute distress, Non-toxic appearance.   HENT: Normocephalic, Atraumatic, Bilateral external ears normal, Oropharynx is clear mucous membranes are moist. No oral exudates or nasal discharge.   Eyes: Pupils are equal round and reactive, EOMI, Conjunctiva normal, No discharge.   Neck: Normal range of motion, No tenderness, Supple, No stridor. No meningismus.  Lymphatic: No lymphadenopathy noted.   Cardiovascular: hypertensive. Regular rate and rhythm without murmur rub or gallop.  Thorax & Lungs: Clear breath sounds bilaterally without wheezes, rhonchi or rales. There is no chest wall tenderness.   Abdomen: Soft non-tender non-distended. There is no rebound or guarding. No organomegaly is appreciated. Bowel sounds are normal.  Skin: Normal without rash.   Back: right sided CVA tenderness. No spinal tenderness.   Extremities: Intact distal pulses, No edema, No tenderness, No cyanosis, No clubbing. Capillary refill is less than 2 seconds.  Musculoskeletal: Good range of motion in all major joints. No tenderness to palpation or major deformities noted.   Neurologic: Alert & oriented x 3, Normal motor function, Normal sensory function, No focal deficits noted. Reflexes are normal.  Psychiatric: Affect normal, Judgment normal, Mood normal. There is no suicidal ideation or patient reported hallucinations.     VITAL SIGNS:   Vitals:    12/20/22 0430 12/20/22 0500 12/20/22 0530 12/20/22 0600   BP:  (!) 174/95 (!) 166/95 (!) 158/85   Pulse: 92 94 98 92   Resp: 15 13 16 18   Temp:       TempSrc:       SpO2: 93% 97% 92% 96%   Weight:       Height:         Vitals: My interpretation: hypertensive, not tachycardic, afebrile, not hypoxic    Reinterpretation of vitals: Improving      DIAGNOSTIC STUDIES    Labs:   Results for orders placed or performed during the hospital encounter of 12/20/22   CBC WITH DIFFERENTIAL   Result Value Ref Range    WBC 17.0 (H) 4.8 - 10.8 K/uL    RBC 4.40 4.20 - 5.40 M/uL     Hemoglobin 13.7 12.0 - 16.0 g/dL    Hematocrit 40.5 37.0 - 47.0 %    MCV 92.0 81.4 - 97.8 fL    MCH 31.1 27.0 - 33.0 pg    MCHC 33.8 33.6 - 35.0 g/dL    RDW 46.7 35.9 - 50.0 fL    Platelet Count 179 164 - 446 K/uL    MPV 9.9 9.0 - 12.9 fL    Neutrophils-Polys 87.60 (H) 44.00 - 72.00 %    Lymphocytes 7.20 (L) 22.00 - 41.00 %    Monocytes 4.10 0.00 - 13.40 %    Eosinophils 0.10 0.00 - 6.90 %    Basophils 0.20 0.00 - 1.80 %    Immature Granulocytes 0.80 0.00 - 0.90 %    Nucleated RBC 0.00 /100 WBC    Neutrophils (Absolute) 14.91 (H) 2.00 - 7.15 K/uL    Lymphs (Absolute) 1.23 1.00 - 4.80 K/uL    Monos (Absolute) 0.70 0.00 - 0.85 K/uL    Eos (Absolute) 0.01 0.00 - 0.51 K/uL    Baso (Absolute) 0.04 0.00 - 0.12 K/uL    Immature Granulocytes (abs) 0.14 (H) 0.00 - 0.11 K/uL    NRBC (Absolute) 0.00 K/uL   COMP METABOLIC PANEL   Result Value Ref Range    Sodium 140 135 - 145 mmol/L    Potassium 3.9 3.6 - 5.5 mmol/L    Chloride 105 96 - 112 mmol/L    Co2 23 20 - 33 mmol/L    Anion Gap 12.0 7.0 - 16.0    Glucose 170 (H) 65 - 99 mg/dL    Bun 25 (H) 8 - 22 mg/dL    Creatinine 1.29 0.50 - 1.40 mg/dL    Calcium 9.1 8.5 - 10.5 mg/dL    AST(SGOT) 23 12 - 45 U/L    ALT(SGPT) 18 2 - 50 U/L    Alkaline Phosphatase 69 30 - 99 U/L    Total Bilirubin 0.4 0.1 - 1.5 mg/dL    Albumin 4.2 3.2 - 4.9 g/dL    Total Protein 6.6 6.0 - 8.2 g/dL    Globulin 2.4 1.9 - 3.5 g/dL    A-G Ratio 1.8 g/dL   URINALYSIS CULTURE, IF INDICATED    Specimen: Urine   Result Value Ref Range    Color Yellow     Character Clear     Specific Gravity 1.038 <1.035    Ph 7.5 5.0 - 8.0    Glucose Negative Negative mg/dL    Ketones Negative Negative mg/dL    Protein Negative Negative mg/dL    Bilirubin Negative Negative    Urobilinogen, Urine 0.2 Negative    Nitrite Negative Negative    Leukocyte Esterase Negative Negative    Occult Blood Moderate (A) Negative    Micro Urine Req Microscopic    CORRECTED CALCIUM   Result Value Ref Range    Correct Calcium 8.9 8.5 - 10.5 mg/dL    ESTIMATED GFR   Result Value Ref Range    GFR (CKD-EPI) 48 (A) >60 mL/min/1.73 m 2   URINE MICROSCOPIC (W/UA)   Result Value Ref Range    WBC 0-2 /hpf    RBC 20-50 (A) /hpf    Bacteria Negative None /hpf    Epithelial Cells Negative /hpf    Hyaline Cast 0-2 /lpf   aPTT   Result Value Ref Range    APTT 26.2 24.7 - 36.0 sec   Prothrombin Time   Result Value Ref Range    PT 16.6 (H) 12.0 - 14.6 sec    INR 1.37 (H) 0.87 - 1.13   Heparin Xa (Unfractionated)   Result Value Ref Range    Heparin Xa (UFH) <0.10 IU/mL   Urine Sodium Random   Result Value Ref Range    Sodium, Urine -per volume 150 mmol/L   Urine Creatinine Random   Result Value Ref Range    Creatinine, Random Urine 31.28 mg/dL   PROCALCITONIN   Result Value Ref Range    Procalcitonin 0.05 <0.25 ng/mL     All labs reviewed by me. Labs were compared to prior labs if they were available. Significant for leukocytosis of 17,000, no significant anemia, normal electrolytes, normal renal function, normal liver enzymes, normal bilirubin, urinalysis negative for infection, ketones but does have moderate blood, PT/INR are slightly elevated but subtherapeutic.    Radiology:   OUTSIDE IMAGES-CT ABDOMEN /PELVIS   Final Result      CT-ABDOMEN-PELVIS W/O   Final Result         1.  Changes of right hydronephrosis with contrast leakage into the perinephric space tracking into the right retroperitoneum, likely from anterior interpolar calyx.   2.  Hazy bilateral lower lobe infiltrates.   3.  Right lung base granuloma   4.  3.0 cm fusiform distal abdominal aortic aneurysm, radiographic follow-up and surveillance recommended as clinically appropriate.      These findings were discussed with the patient's clinician, Alton Abernathy, on 12/20/2022 4:42 AM.            The radiologist's interpretation of all radiological studies have been reviewed by me.    COURSE & MEDICAL DECISION MAKING     Nursing notes, vital signs, [id number] reviewed in chart     Differential diagnoses  include but not limited to UTI, kidney stone, appendicitis    Prior records reviewed which indicate Patient with a 5 mm kidney stone and possible ureteral rupture secondary to obstructed stone    DISCUSSION OF MANAGEMENT WITH OTHER PHYSICIANS, QHP OR APPROPRIATE SOURCE  Select: Admitting team 5:00 AM: consulted with Dr. Burgos and Urology (Dr. Clarke) at 4:52 AM.    INDEPENDENT INTERPRETATION OF STUDIES  Select: CT scan(s) shows extravasation of contrast from the renal collecting system and ureter on the right    ESCALATION OF CARE  Based on the presentation of the patient, I have considered IV fluids. , Based on the presentation of the patient I have considered blood analysis. , and Based on the presentation of the patient I have considered diagnostic imaging.    SOCIAL DETERMINANTS THAT SIGNIFICANTLY AFFECT CARE  Select: None    PRESCRIPTION DRUG CONSIDERED  Select: Antibiotics ceftriaxone was given IV and Pain Medications IV pain medications    DIAGNOSTICS TESTS CONSIDERED  CT imaging ordered    ADDITIONAL PROBLEMS ADDRESSED - COPA  In addition to the chief complaint, I have addressed the following problems none    PLAN   3:25 AM  Patient was treated with Hydromorphone 1 mg and Zofran 4 mg for her symptoms. Will order CT-abdomen-pelvis, CBC with differential, CMP, and UA culture to evaluate her complaints.    COURSE/MDM  This pleasant 57-year-old female presents as a transfer from outside facility.  Patient had sudden onset right-sided flank pain, and CT imaging at that facility showed 5 mm distal UVJ stone with concern for hydronephrosis and possible ureteral rupture and was sent here for urology evaluation.  Upon arrival patient in significant pain and was immediately given pain and nausea IV medications.  Radiology recommended rescanning the patient for urethrogram to evaluate if there is contrast extravasation as that was a concern from prior radiology read at outside facility.  Patient was consented and  underwent reCT imaging.  I reviewed images on my own and my initial impression shows that there is obvious extravasation of contrast from around the proximal ureter and kidney.  Discussed with radiologist and they agree with this assessment.  Discussed with urologist on-call who recommended IV antibiotics, IV fluids and they will evaluate the patient for planning.  They recommended that the hospitalist admit for continued pain management.  Discussed with the patient and her  at bedside and he was able to provide me with more collateral information regarding patient's presentation and they both verbalized understanding plan for admission.    4:49 AM I discussed the patient's case and the above findings with Dr. Clarke (Urology) who wants her to be made NPO, start her on Rocephin, and he agrees to evaluate in the morning.     4:59 AM I discussed the patient's case and the above findings with Dr. Burgos (hospitalist) who agrees to hospitalize the patient and take over the plan of care moving forward.     DISPOSITION   Admission    CONDITION AT DISPOSITION  stable     FINAL IMPRESSION   1. Kidney stone Acute     The note accurately reflects work and decisions made by me.  Alton Abernathy  12/20/2022  6:58 AM     Carmelita SOLANO (Karina), am scribing for, and in the presence of, Alton Abernathy.    Electronically signed by: Carmelita Pierson), 12/20/2022    Alton SOLANO personally performed the services described in this documentation, as scribed by Carmelita Stark in my presence, and it is both accurate and complete.

## 2022-12-20 NOTE — ASSESSMENT & PLAN NOTE
CHF, at baseline, not in acute exacerbation   - resume home cardiac medications   - monitor renal function/electrolytes    SW reached out  [facility] and spoke with Michael, director ([phone].  She confirmed that the patient does reside in their  and that she is able to return at discharge.  She provided name of RN, Deanna,to contact regarding the actual discharge and the setting up of HH.  Her number is [phone].  SW called and left message requesting a phone call back.  Patient is medically cleared and can d/c today once facility calls back and HH is set up.  SW will contnue to follow.     12:27 PM  Patient's meds have been delivered to the floor and SW set up transportation via [employer] van for immediate .    Moni Jamison, PARKER  [facility]  [phone]

## 2022-12-20 NOTE — ED NOTES
Heparin drip started per orders. ERP at bedside discussing plan of care with pt and SO, who is now at bedside. Pt and SO demonstrated understanding and had all questions answered at this time. Will continue to monitor pt while awaiting room assignment upstairs.

## 2022-12-20 NOTE — H&P
[facility] Medicine History & Physical Note    Date of Service  12/20/2022    Primary Care Physician  Pcp Pt States None    Consultants  Urology (Dr. Clarke)    Code Status  Full Code    Chief Complaint  Chief Complaint   Patient presents with    Abdominal Pain     Pt had RLQ pain and went to [facility] [place] where they found a 5mm stone in right kidney, pt denies hx of kidney stones in the past       History of Presenting Illness  Emma Gonsalez is a 57 y.o. female with history of mechanical aortic valve replacement 2018 on warfarin, hypertension who presented 12/20/2022 with evaluation for abdominal/flank pain.  Patient was transferred from [facility] [place] ER to [facility] for urology evaluation.  Patient reported having excruciating sudden onset right groin pain, radiating to back earlier today, and now going to [facility] [place] ER for evaluation.  Her CTAP at the facility was concerning for 5 mm right UVJ stone with hydronephrosis, as well as concerning right ureteral rupture.  Therefore, patient was subsequently transferred in [facility]  ED for urology evaluation.  Repeat CT AP/urethrogram noted changes of right hydronephrosis with contrast leakage into perinephric space tracking into right retroperitoneum, likely from anterior interpolar calyx.  Case was discussed with urology, tentative plan for invasive intervention in AM.  Admitted to medicine service for further evaluation and treatment.    I discussed the plan of care with patient and bedside RN.    Review of Systems  Review of Systems   Constitutional:  Positive for chills and malaise/fatigue. Negative for fever.   HENT:  Negative for hearing loss and tinnitus.    Eyes:  Negative for blurred vision and double vision.   Respiratory:  Negative for cough and shortness of breath.    Cardiovascular:  Negative for chest pain and palpitations.   Gastrointestinal:  Positive for abdominal pain, nausea and vomiting. Negative for heartburn.   Genitourinary:  Positive for dysuria, flank pain, frequency and  urgency. Negative for hematuria.   Musculoskeletal:  Positive for back pain. Negative for joint pain and myalgias.   Neurological:  Positive for weakness. Negative for dizziness and headaches.   Endo/Heme/Allergies:  Negative for environmental allergies. Does not bruise/bleed easily.   Psychiatric/Behavioral:  Negative for depression and substance abuse.    All other systems reviewed and are negative.    Past Medical History   has a past medical history of Hypertension.  Mechanical aortic valve replacement 2018    Surgical History   has a past surgical history that includes aortic valve replacement (2018) and primary c section.     Family History  family history is not on file.   Family history reviewed with patient. There is no family history that is pertinent to the chief complaint.   Denies FH of nephrolithiasis    Social History   reports that she has never smoked. She has never used smokeless tobacco. She reports that she does not currently use alcohol. She reports that she does not use drugs.    Allergies  No Known Allergies    Medications  Prior to Admission Medications   Prescriptions Last Dose Informant Patient Reported? Taking?   warfarin (COUMADIN) 2 MG Tab   Yes No   Sig: Take  by mouth every day. Indications: valve replacement      Facility-Administered Medications: None       Physical Exam  Temp:  [36.3 °C (97.3 °F)-36.4 °C (97.6 °F)] 36.4 °C (97.6 °F)  Pulse:  [81-94] 94  Resp:  [13-18] 13  BP: (158-189)/() 174/95  SpO2:  [93 %-99 %] 97 %  Blood Pressure: (!) 158/87   Temperature: 36.4 °C (97.6 °F)   Pulse: 91   Respiration: 17   Pulse Oximetry: 95 %       Physical Exam  Vitals and nursing note reviewed.   Constitutional:       General: She is not in acute distress.  HENT:      Head: Normocephalic and atraumatic.      Nose: Nose normal.      Mouth/Throat:      Mouth: Mucous membranes are dry.      Pharynx: Oropharynx is clear.   Eyes:      General: No scleral icterus.     Extraocular Movements:  Extraocular movements intact.   Cardiovascular:      Rate and Rhythm: Regular rhythm. Tachycardia present.      Pulses: Normal pulses.      Heart sounds:     No friction rub.   Pulmonary:      Effort: No respiratory distress.      Breath sounds: No stridor. No wheezing or rales.   Abdominal:      General: There is distension.   Genitourinary:     Comments: Right sided CVA tenderness  Musculoskeletal:         General: Tenderness present.      Cervical back: Neck supple. No tenderness.   Skin:     General: Skin is warm and dry.      Capillary Refill: Capillary refill takes less than 2 seconds.   Neurological:      General: No focal deficit present.      Mental Status: She is alert and oriented to person, place, and time.   Psychiatric:         Mood and Affect: Mood normal.       Laboratory:  Recent Labs     12/20/22  0040 12/20/22  0240   WBC 12.0* 17.0*   RBC 4.25 4.40   HEMOGLOBIN 13.4 13.7   HEMATOCRIT 39.1 40.5   MCV 92.0 92.0   MCH 31.5* 31.1   MCHC 34.3 33.8   RDW 13.7 46.7   PLATELETCT 194 179   MPV 10.4 9.9     Recent Labs     12/20/22  0040 12/20/22  0240   SODIUM 141 140   POTASSIUM 3.4* 3.9   CHLORIDE 102 105   CO2 26 23   GLUCOSE 196* 170*   BUN 28* 25*   CREATININE 1.3* 1.29   CALCIUM 9.1 9.1     Recent Labs     12/20/22  0040 12/20/22  0240   ALTSGPT 20 18   ASTSGOT 17 23   ALKPHOSPHAT 74 69   TBILIRUBIN 0.4 0.4   LIPASE 88  --    GLUCOSE 196* 170*     Recent Labs     12/20/22  0040   APTT 23.6   INR 1.28     No results for input(s): NTPROBNP in the last 72 hours.      No results for input(s): TROPONINT in the last 72 hours.    Imaging:  CT-ABDOMEN-PELVIS W/O   Final Result         1.  Changes of right hydronephrosis with contrast leakage into the perinephric space tracking into the right retroperitoneum, likely from anterior interpolar calyx.   2.  Hazy bilateral lower lobe infiltrates.   3.  Right lung base granuloma   4.  3.0 cm fusiform distal abdominal aortic aneurysm, radiographic follow-up and  surveillance recommended as clinically appropriate.      These findings were discussed with the patient's clinician, Alton Abernathy, on 12/20/2022 4:42 AM.             no X-Ray or EKG requiring interpretation    Assessment/Plan:  Justification for Admission Status  I anticipate this patient will require at least 2 midnights of hospitalization, therefore appropriate for inpatient status.        * Obstructive uropathy  Assessment & Plan  Noted to have 5 mm UVJ stone with obstructive hydronephrosis  IVF  Pain control  Flomax  Urology consulted    Rupture of ureter- (present on admission)  Assessment & Plan  Suspected right-sided ureteral rupture  Thought to be secondary to obstructive nephrolithiasis    Supportive pain control  Urology consulted, tentative plan for intervention    Pyelonephritis  Assessment & Plan  +CVA tenderness with right sided obstructive uropathy  IVF  Abx: ceftriaxone  Follow cultures    Sepsis (HCC)  Assessment & Plan  This is Sepsis Present on admission  SIRS criteria identified on my evaluation include: Tachypnea, with respirations greater than 20 per minute, Leukocytosis, with WBC greater than 12,000 and Bandemia, greater than 10% bands  Source is   Sepsis protocol initiated  Fluid resuscitation ordered per protocol  Crystalloid Fluid Administration: Fluid resuscitation ordered per standard protocol - 30 mL/kg per current or ideal body weight  IV antibiotics as appropriate for source of sepsis  Reassessment: I have reassessed the patient's hemodynamic status          H/O mechanical aortic valve replacement  Assessment & Plan  S/p mechanical AVR 2018 per pt  Was on warfarin -- subtherapeutic    Heparin gtt for now, turn off at 7am 12/20 for planned invasive urological procedure  --resume heparin gtt or warfarin thereafter when appropriate    RENATA (acute kidney injury) (HCC)  Assessment & Plan  Secondary to obstructive uropathy  IVF    Leukocytosis  Assessment & Plan  IVF,  abx  trend        VTE prophylaxis: heparin gtt

## 2022-12-20 NOTE — ANESTHESIA TIME REPORT
Anesthesia Start and Stop Event Times     Date Time Event    12/20/2022 1357 Ready for Procedure     1440 Anesthesia Start     1513 Anesthesia Stop        Responsible Staff  12/20/22    Name Role Begin End    Ally Harris M.D. Anesth 1440 1513        Overtime Reason:  no overtime (within assigned shift)    Comments:

## 2022-12-20 NOTE — ED NOTES
Blood drawn from pt's PIV and sent to lab. Urine sample also sent to lab. Pt medicated for 5/10 pain per MAR and tolerated well. Will continue to monitor pt.

## 2022-12-20 NOTE — ED NOTES
Unable to obtain med rec at this time  Patient is unsure of her medications  Unable to call home pharmacy, [place]  Patient states her SO is enrt to the hospital and has a list of her medications

## 2022-12-20 NOTE — ED NOTES
Report given to Noemí LAO RN. Upon shift change pt is sleeping in bed with SO at bedside. Pt has even and unlabored breaths and is in no acute distress at this time.

## 2022-12-20 NOTE — ASSESSMENT & PLAN NOTE
Noted to have 5 mm UVJ stone with obstructive hydronephrosis and possible ureteral rupture   Continue IVF  Supportive care with pain control and antiemetics   Continue started Flomax  Urology consulted, cystoscopy today with potential stent placement

## 2022-12-20 NOTE — ED NOTES
Pt back from CT and resting in bed with even and unlabored breaths. No acute distress noted. Will continue to monitor.

## 2022-12-20 NOTE — ANESTHESIA PREPROCEDURE EVALUATION
Case: [id number] Date/Time: 12/20/22 1345    Procedures:       CYSTOSCOPY, WITH URETERAL STENT INSERTION (Right)      URETEROSCOPY      LITHOTRIPSY, USING LASER    Location: [facility] OR 09 / SURGERY [facility]    Surgeons: Oleksandr Whitman M.D.          Relevant Problems      (positive) RENATA (acute kidney injury) (HCC)       Physical Exam    Airway   Mallampati: I  TM distance: >3 FB  Neck ROM: full       Cardiovascular - normal exam     Dental - normal exam           Pulmonary   Breath sounds clear to auscultation     Abdominal    Neurological - normal exam                 Anesthesia Plan    ASA 2       Plan - general       Airway plan will be LMA          Induction: intravenous      Pertinent diagnostic labs and testing reviewed    Informed Consent:    Anesthetic plan and risks discussed with patient.

## 2022-12-20 NOTE — ASSESSMENT & PLAN NOTE
Suspected right-sided ureteral rupture  Thought to be secondary to obstructive nephrolithiasis    Supportive pain control  Urology consulted, tentative plan for intervention  Continue IV antibiotics for now

## 2022-12-20 NOTE — ED NOTES
Second set of blood cultures drawn and pt medicated with antibiotic. Blue top was also drawn and sent for Heparin Xa. Once resulted will start Heparin drip per order. Per Hospitalist Heparin drip is only to be given for 2 hours then stopped.

## 2022-12-20 NOTE — HOSPITAL COURSE
Emma Gonsalez is a 57 y.o. female with history of mechanical aortic valve replacement 2018 on warfarin, hypertension, history of congestive heart failure (EF 20% ->45%) who presented 12/20/2022 with evaluation for abdominal/flank pain where she was found to have a right 5 mm obstructing UVJ stone with right hydronephrosis and suspected ureteral rupture.  [place] [facility] [place] [facility] ER for urological evaluation.  Patient reported having excruciating sudden onset right groin pain, radiating to back.     Repeat CT AP/urethrogram noted changes of right hydronephrosis with contrast leakage into perinephric space tracking into right retroperitoneum, likely from anterior interpolar calyx.  Case was discussed with urology, tentative plan for invasive intervention thus she was admitted for further evaluation and treatment.  Scheduled for OR for cystoscopy and right ureteroscopy with possible laser lithotripsy and stent placement on 12/21/2022.

## 2022-12-20 NOTE — ED NOTES
Second PIV placed and first set of blood cultures drawn, pt tolerated well.    Hospitalist at bedside assessing pt.

## 2022-12-20 NOTE — ED NOTES
Pt ambulatory to bathroom without assistance to obtain urine sample, pt tolerated well but states her pain is coming back.

## 2022-12-20 NOTE — ANESTHESIA PROCEDURE NOTES
Airway    Date/Time: 12/20/2022 2:44 PM  Performed by: Ally Harris M.D.  Authorized by: Ally Harris M.D.     Location:  OR  Urgency:  Elective  Indications for Airway Management:  Anesthesia      Spontaneous Ventilation: absent    Sedation Level:  Deep  Preoxygenated: Yes    Mask Difficulty Assessment:  0 - not attempted  Final Airway Type:  Supraglottic airway  Final Supraglottic Airway:  Standard LMA    SGA Size:  3  Number of Attempts at Approach:  1

## 2022-12-21 ENCOUNTER — PHARMACY VISIT (OUTPATIENT)
Dept: PHARMACY | Facility: MEDICAL CENTER | Age: 57
End: 2022-12-21
Payer: COMMERCIAL

## 2022-12-21 VITALS
HEIGHT: 66 IN | WEIGHT: 138.01 LBS | DIASTOLIC BLOOD PRESSURE: 70 MMHG | OXYGEN SATURATION: 95 % | SYSTOLIC BLOOD PRESSURE: 116 MMHG | HEART RATE: 65 BPM | TEMPERATURE: 98.1 F | RESPIRATION RATE: 15 BRPM | BODY MASS INDEX: 22.18 KG/M2

## 2022-12-21 PROBLEM — N13.9 OBSTRUCTIVE UROPATHY: Status: RESOLVED | Noted: 2022-12-20 | Resolved: 2022-12-21

## 2022-12-21 PROBLEM — N12 PYELONEPHRITIS: Status: RESOLVED | Noted: 2022-12-20 | Resolved: 2022-12-21

## 2022-12-21 PROBLEM — S37.19XA: Status: RESOLVED | Noted: 2022-12-20 | Resolved: 2022-12-21

## 2022-12-21 PROBLEM — N17.9 AKI (ACUTE KIDNEY INJURY) (HCC): Status: RESOLVED | Noted: 2022-12-20 | Resolved: 2022-12-21

## 2022-12-21 LAB
ANION GAP SERPL CALC-SCNC: 10 MMOL/L (ref 7–16)
BASOPHILS # BLD AUTO: 0.2 % (ref 0–1.8)
BASOPHILS # BLD: 0.02 K/UL (ref 0–0.12)
BUN SERPL-MCNC: 21 MG/DL (ref 8–22)
CALCIUM SERPL-MCNC: 9.3 MG/DL (ref 8.5–10.5)
CHLORIDE SERPL-SCNC: 106 MMOL/L (ref 96–112)
CO2 SERPL-SCNC: 24 MMOL/L (ref 20–33)
CREAT SERPL-MCNC: 1.17 MG/DL (ref 0.5–1.4)
EOSINOPHIL # BLD AUTO: 0 K/UL (ref 0–0.51)
EOSINOPHIL NFR BLD: 0 % (ref 0–6.9)
ERYTHROCYTE [DISTWIDTH] IN BLOOD BY AUTOMATED COUNT: 48.1 FL (ref 35.9–50)
GFR SERPLBLD CREATININE-BSD FMLA CKD-EPI: 54 ML/MIN/1.73 M 2
GLUCOSE SERPL-MCNC: 122 MG/DL (ref 65–99)
HCT VFR BLD AUTO: 39.4 % (ref 37–47)
HGB BLD-MCNC: 13.4 G/DL (ref 12–16)
IMM GRANULOCYTES # BLD AUTO: 0.1 K/UL (ref 0–0.11)
IMM GRANULOCYTES NFR BLD AUTO: 0.8 % (ref 0–0.9)
INR PPP: 1.63 (ref 0.87–1.13)
LYMPHOCYTES # BLD AUTO: 2.07 K/UL (ref 1–4.8)
LYMPHOCYTES NFR BLD: 16.5 % (ref 22–41)
MCH RBC QN AUTO: 31.2 PG (ref 27–33)
MCHC RBC AUTO-ENTMCNC: 34 G/DL (ref 33.6–35)
MCV RBC AUTO: 91.8 FL (ref 81.4–97.8)
MONOCYTES # BLD AUTO: 0.8 K/UL (ref 0–0.85)
MONOCYTES NFR BLD AUTO: 6.4 % (ref 0–13.4)
NEUTROPHILS # BLD AUTO: 9.52 K/UL (ref 2–7.15)
NEUTROPHILS NFR BLD: 76.1 % (ref 44–72)
NRBC # BLD AUTO: 0 K/UL
NRBC BLD-RTO: 0 /100 WBC
PLATELET # BLD AUTO: 154 K/UL (ref 164–446)
PMV BLD AUTO: 10.1 FL (ref 9–12.9)
POTASSIUM SERPL-SCNC: 4 MMOL/L (ref 3.6–5.5)
PROTHROMBIN TIME: 19 SEC (ref 12–14.6)
RBC # BLD AUTO: 4.29 M/UL (ref 4.2–5.4)
SODIUM SERPL-SCNC: 140 MMOL/L (ref 135–145)
WBC # BLD AUTO: 12.5 K/UL (ref 4.8–10.8)

## 2022-12-21 PROCEDURE — 700101 HCHG RX REV CODE 250: Performed by: STUDENT IN AN ORGANIZED HEALTH CARE EDUCATION/TRAINING PROGRAM

## 2022-12-21 PROCEDURE — 99239 HOSP IP/OBS DSCHRG MGMT >30: CPT

## 2022-12-21 PROCEDURE — A9270 NON-COVERED ITEM OR SERVICE: HCPCS | Performed by: STUDENT IN AN ORGANIZED HEALTH CARE EDUCATION/TRAINING PROGRAM

## 2022-12-21 PROCEDURE — 85610 PROTHROMBIN TIME: CPT

## 2022-12-21 PROCEDURE — 700111 HCHG RX REV CODE 636 W/ 250 OVERRIDE (IP): Performed by: STUDENT IN AN ORGANIZED HEALTH CARE EDUCATION/TRAINING PROGRAM

## 2022-12-21 PROCEDURE — 700102 HCHG RX REV CODE 250 W/ 637 OVERRIDE(OP): Performed by: STUDENT IN AN ORGANIZED HEALTH CARE EDUCATION/TRAINING PROGRAM

## 2022-12-21 PROCEDURE — 80048 BASIC METABOLIC PNL TOTAL CA: CPT

## 2022-12-21 PROCEDURE — RXMED WILLOW AMBULATORY MEDICATION CHARGE

## 2022-12-21 PROCEDURE — 700111 HCHG RX REV CODE 636 W/ 250 OVERRIDE (IP)

## 2022-12-21 PROCEDURE — 85025 COMPLETE CBC W/AUTO DIFF WBC: CPT

## 2022-12-21 RX ORDER — WARFARIN SODIUM 6 MG/1
12 TABLET ORAL ONCE
Status: DISCONTINUED | OUTPATIENT
Start: 2022-12-21 | End: 2022-12-21

## 2022-12-21 RX ORDER — WARFARIN SODIUM 10 MG/1
10 TABLET ORAL ONCE
Status: DISCONTINUED | OUTPATIENT
Start: 2022-12-21 | End: 2022-12-21

## 2022-12-21 RX ORDER — WARFARIN SODIUM 4 MG/1
8 TABLET ORAL
Status: DISCONTINUED | OUTPATIENT
Start: 2022-12-24 | End: 2022-12-21

## 2022-12-21 RX ORDER — ENOXAPARIN SODIUM 100 MG/ML
1 INJECTION SUBCUTANEOUS EVERY 12 HOURS
Qty: 14 EACH | Refills: 0 | Status: SHIPPED | OUTPATIENT
Start: 2022-12-21 | End: 2022-12-28

## 2022-12-21 RX ORDER — ENOXAPARIN SODIUM 100 MG/ML
1 INJECTION SUBCUTANEOUS EVERY 12 HOURS
Status: DISCONTINUED | OUTPATIENT
Start: 2022-12-21 | End: 2022-12-21

## 2022-12-21 RX ORDER — ENOXAPARIN SODIUM 100 MG/ML
1 INJECTION SUBCUTANEOUS EVERY 12 HOURS
Status: DISCONTINUED | OUTPATIENT
Start: 2022-12-21 | End: 2022-12-21 | Stop reason: HOSPADM

## 2022-12-21 RX ADMIN — OMEPRAZOLE 20 MG: 20 CAPSULE, DELAYED RELEASE ORAL at 06:00

## 2022-12-21 RX ADMIN — CEFTRIAXONE SODIUM 1000 MG: 10 INJECTION, POWDER, FOR SOLUTION INTRAVENOUS at 06:47

## 2022-12-21 RX ADMIN — HEPARIN SODIUM 16 UNITS/KG/HR: 5000 INJECTION, SOLUTION INTRAVENOUS at 10:24

## 2022-12-21 RX ADMIN — TAMSULOSIN HYDROCHLORIDE 0.4 MG: 0.4 CAPSULE ORAL at 06:47

## 2022-12-21 RX ADMIN — ENOXAPARIN SODIUM 60 MG: 60 INJECTION SUBCUTANEOUS at 13:29

## 2022-12-21 RX ADMIN — LIDOCAINE 1 PATCH: 700 PATCH TOPICAL at 06:47

## 2022-12-21 RX ADMIN — ACETAMINOPHEN 1000 MG: 500 TABLET ORAL at 09:29

## 2022-12-21 RX ADMIN — RAMIPRIL 10 MG: 5 CAPSULE ORAL at 06:46

## 2022-12-21 RX ADMIN — SPIRONOLACTONE 50 MG: 50 TABLET ORAL at 06:46

## 2022-12-21 RX ADMIN — BISOPROLOL FUMARATE 10 MG: 5 TABLET ORAL at 06:45

## 2022-12-21 RX ADMIN — SENNOSIDES AND DOCUSATE SODIUM 2 TABLET: 50; 8.6 TABLET ORAL at 06:47

## 2022-12-21 RX ADMIN — ACETAMINOPHEN 1000 MG: 500 TABLET ORAL at 01:24

## 2022-12-21 NOTE — CONSULTS
DATE OF SERVICE:  2022     UROLOGY CONSULTATION     REQUESTING PHYSICIAN:  Alton Abernathy MD from the emergency room.     CONSULTING PHYSICIAN:  Oleksandr Whitman MD with Urology.     REASON FOR CONSULTATION:  Right ureteral stone.     HISTORY OF PRESENT ILLNESS:  The patient is a 57-year-old female transferred   from [facility] to [facility] with a 5-mm right UVJ stone having   right flank pain.  The patient denies any prior stones.  She denies any   fevers, chills, chest pain, shortness of breath, nausea, vomiting, dysuria,   gross hematuria.     PAST MEDICAL HISTORY:  Significant for hypertension, mechanical aortic valve   replacement in 2018.     PAST SURGICAL HISTORY:   and aortic valve replacement in 2018.     ALLERGIES:  No known drug allergies.     MEDICATIONS ON ADMISSION:  Coumadin.     REVIEW OF SYSTEMS:  See HPI, otherwise complete review of systems is negative.     PHYSICAL EXAMINATION:  GENERAL:  The patient is alert, in no acute distress.  LUNGS:  Breathing is nonlabored.  CARDIOVASCULAR:  Pulse is regular.  ABDOMEN:  Tender in the right lower abdomen.  No tenderness on the left side.  EXTREMITIES:  The patient moves all extremities normally.  NEUROLOGIC:  Exam is grossly intact.     LABORATORY DATA:  Show a white blood cell count of 17.0, hemoglobin 13.7,   hematocrit 40% and platelets of 179,00.  Sodium 140, potassium 3.9, chloride   105, bicarbonate 23, BUN 25, creatinine 1.29, glucose of 170.     DIAGNOSTIC DATA:  CT of abdomen and pelvis without contrast shows a 5-mm right   UVJ stone with right hydronephrosis and perinephric fluid.     ASSESSMENT AND PLAN:  This is a 57-year-old female with right flank pain and   hydronephrosis from a 5-mm distal right ureteral stone.  Plan is to go to the   operating room for a cystoscopy, right ureteroscopy with possible laser   lithotripsy and possible stent placement.        ______________________________  Oleksandr Whitman  MD CAMERON/TACO    DD:  12/20/2022 15:18  DT:  12/20/2022 19:07    Job#:  [id number]

## 2022-12-21 NOTE — PROGRESS NOTES
4 Eyes Skin Assessment Completed by EDDIE Crowe and EDDIE Nolan.    Head WDL  Ears WDL  Nose WDL  Mouth WDL  Neck WDL  Breast/Chest WDL  Shoulder Blades WDL  Spine WDL  (R) Arm/Elbow/Hand WDL  (L) Arm/Elbow/Hand WDL  Abdomen WDL  Groin WDL  Scrotum/Coccyx/Buttocks WDL  (R) Leg WDL  (L) Leg WDL  (R) Heel/Foot/Toe WDL  (L) Heel/Foot/Toe WDL          Devices In Places none        Interventions In Place N/A    Possible Skin Injury No    Pictures Uploaded Into Epic N/A  Wound Consult Placed N/A  RN Wound Prevention Protocol Ordered No

## 2022-12-21 NOTE — CARE PLAN
The patient is Stable - Low risk of patient condition declining or worsening    Shift Goals  Clinical Goals: heparin  Patient Goals: discharge tomorrow  Family Goals: NGA    Progress made toward(s) clinical / shift goals:  dc     Patient is not progressing towards the following goals:

## 2022-12-21 NOTE — PROGRESS NOTES
Confirmed with on-call Urologist MD Myrick that patient is now cleared to resume anti-coagulation post procedure.     Linden Cheema, PharmD  Clinical Pharmacist

## 2022-12-21 NOTE — PROGRESS NOTES
Inpatient Anticoagulation Service Note    Date: 12/20/2022  Reason for Anticoagulation: Aortic Mechanical Valve Replacement             Hemoglobin Value: 13.7  Hematocrit Value: 40.5  Lab Platelet Value: 179  Target INR: 2.0 to 3.0    INR from last 7 days       Date/Time INR Value    12/20/22 0540 1.37          Dose from last 7 days       Date/Time Dose (mg)    12/20/22 2235 7    12/20/22 2047 7          Average Dose (mg): 51  Significant Interactions: Antibiotics  Bridge Therapy: Yes  Bridge Therapy Start Date: 12/27/22  Days of Overlap Therapy: 0  INR Value Greater than 2 Prior to Discontinuation of Parenteral Anticoagulation: Not Applicable     Reversal Agent Administered: Not Applicable  Comments: Pt on warfarin for mechanical aortic valve replacement. Questionable complaince, as INR 1.28 on admission. Pt heparizined pre and post urulogic procedure, will know bridge from heparin to warfarin.    Plan:  Warfarin 7 mg on weekdays, 8 mg on weekends. Continue heparin infusion for bridge therapy  Education Material Provided?: No  Pharmacist suggested discharge dosing: Warfarin 7 mg on weekdays, 8 mg on weekends     Linden Cheema, PharmD

## 2022-12-21 NOTE — OP REPORT
DATE OF SERVICE:  12/20/2022     PREOPERATIVE DIAGNOSIS:  Right ureteral stone.     POSTOPERATIVE DIAGNOSIS:  Right ureteral stricture.     PROCEDURES PERFORMED:  Cystoscopy, right ureteroscopy with dilation of right   ureteral stricture and right ureteral stent placement.     SURGEON:  Oleksandr Whitman MD     ANESTHESIOLOGIST:  Ally Harris MD     ANESTHESIA:  General.     INDICATIONS FOR PROCEDURE:  The patient is a 57-year-old female with recent   right-sided abdominal pain and CT showing a 5 mm right UVJ stone.  After   discussing treatment options, she elected for right ureteroscopy with laser   lithotripsy and stent placement.     DESCRIPTION OF PROCEDURE:  After obtaining informed consent, the patient was   brought to the operating room.  After the induction of general anesthesia, she   was positioned in dorsal lithotomy position and her genitalia were prepped   and draped in sterile fashion.  She received Ancef as antibiotic prophylaxis   prior to starting the procedure.  I passed a 20-Indian cystoscope into the   bladder under direct vision.  The bladder was surveyed in its entirety and was   normal in appearance.  I passed a sensor wire up the right ureter under   fluoroscopic guidance.  After this, I passed a semi-rigid ureteroscope up the   distal ureter, there was a very narrow area just inside the ureteral orifice   that would not easily accommodate the scope, so I used a 0 tip basket to pass   up there and to pass the ureteroscope up over that.  I used the ureteroscope   to gently dilate this tight area until the ureteroscope could pass beyond that   area up into the distal ureter.  I passed the ureteroscope all the way up to   the proximal third of the ureter with no stones seen.  I then looked back down   the ureter and found this area of where the stricture had been dilated, was a   fair amount of edema there and tightness of the ureter still, but no stones   seen.  So, I elected to place a  stent to allow that ureteral stricture to   heal.  I then performed a retrograde pyelogram through the ureteroscope to   outline the renal pelvis and calices for stent placement and then passed up a   6-Norwegian x24 cm stent until it was seen to coil in the renal pelvis on   fluoroscopy as well as in the bladder under direct vision.  There was good   efflux through the stent.  The string was left intact on the stent and taped   to her right inner thigh.  The patient was then awokened from anesthesia and   taken to recovery room in stable condition.     COMPLICATIONS:  None.     ESTIMATED BLOOD LOSS:  5 mL     SPECIMENS:  None.     DRAINS:  A 6-Norwegian x24 cm right ureteral stent with string.        ______________________________  MD NISHA Blair/TACO    DD:  12/20/2022 15:12  DT:  12/20/2022 17:47    Job#:  [id number]

## 2022-12-21 NOTE — OR NURSING
Pt taken via bed to S601-2. Report given to receiving RN Amrita. Two bags of personal belongings sent w/ pt. VSS. No distress.

## 2022-12-21 NOTE — PROGRESS NOTES
Awaiting lab to come draw Anti-xa, lab reports they are short 3 staff and are behind in drawing labs. Charge notified and passed on in report.

## 2022-12-21 NOTE — DISCHARGE INSTRUCTIONS
Take your coumadin as directed by your coumadin plan until you get your INR checked and can be adjusted by your established team.    You will need to continue lovenox injections until your INR is therapeutic, or until otherwise instructed by your established doctor.    Check INR as soon as you get home, ideally by Friday.    You will need urgent followup with your family doctor for your coumadin and urology for your ureteral stent.   You also need to check in with your cardiologist to let them know about these events. As we discussed, you also were found to have an 3cm abdominal aortic aneurysm that you will need to follow with your cardiologist within the next few weeks.     Seek urgent care for any fevers, increased pain, bleeding, or inability to urinate.   If you suspect you are developing UTI, seek urgent care as well.     Attached are instructions on how to give lovenox injections, as well as information on after care for kidney stones and ureteral stents.     If you plan on driving long distances in the next few days, stop every 2 to 3 hours and take a short walk to stretch your legs.

## 2022-12-21 NOTE — CARE PLAN
The patient is Stable - Low risk of patient condition declining or worsening    Shift Goals  Clinical Goals: Heparin drip and being therapeutic.  Patient Goals: discharge tomorrow    Progress made toward(s) clinical / shift goals:    Problem: Knowledge Deficit - Standard  Goal: Patient and family/care givers will demonstrate understanding of plan of care, disease process/condition, diagnostic tests and medications  Outcome: Progressing     Problem: Hemodynamics  Goal: Patient's hemodynamics, fluid balance and neurologic status will be stable or improve  Outcome: Progressing     Problem: Fluid Volume  Goal: Fluid volume balance will be maintained  Outcome: Progressing     Problem: Urinary - Renal Perfusion  Goal: Ability to achieve and maintain adequate renal perfusion and functioning will improve  Outcome: Progressing     Problem: Respiratory  Goal: Patient will achieve/maintain optimum respiratory ventilation and gas exchange  Outcome: Progressing     Problem: Physical Regulation  Goal: Diagnostic test results will improve  Outcome: Progressing  Goal: Signs and symptoms of infection will decrease  Outcome: Progressing       Patient is not progressing towards the following goals:

## 2022-12-21 NOTE — PROGRESS NOTES
Inpatient Anticoagulation Service Note for 12/21/2022    Reason for Anticoagulation: Aortic Mechanical Valve Replacement       Hemoglobin Value: 13.4  Hematocrit Value: 39.4  Lab Platelet Value: (!) 154  Target INR: 2.0 to 3.0    INR from last 7 days       Date/Time INR Value    12/21/22 0737 1.63    12/20/22 2149 1.49    12/20/22 0540 1.37          Dose from last 7 days       Date/Time Dose (mg)    12/21/22 1136 10    12/20/22 2235 7    12/20/22 2047 7          Average Dose (mg): 7.3  Significant Interactions: Antibiotics  Bridge Therapy: Yes  Bridge Therapy Start Date: 12/20/22  Days of Overlap Therapy: 1   INR Value Greater than 2 Prior to Discontinuation of Parenteral Anticoagulation: Not Applicable     Reversal Agent Administered: Not Applicable  Comments: Pt on warfarin for mechanical aortic valve replacement.  Pt heparizined pre and post urologic procedure; currently on heparin bridge.  INR trending upward.  Planned discharge today.    Plan:  Give 10 mg warfarin x 1 now as a bolus in attempt to get INR therapeutic quicker.  Would recommend continuing bridge therapy upon discharge until INR therapeutic.    Education Material Provided?: No (chronic coumadin therapy)    Pharmacist suggested discharge dosing: Warfarin 7 mg M-F and 8 mg Sa/Bello per home regimen with f/u INR in 2-3 days after discharge.     Ayanna Thompson, PharmD

## 2022-12-21 NOTE — DISCHARGE PLANNING
Medical Social Work  PC from [employer], requested fax number to send medical emergency policy which requires prior auth or all non standard testing or invasive surgery.    SW received said documentation.  PC to Deonna MYERS, requested SW email to her and she will send to appropriate person to review.    SW emailed documentation

## 2022-12-21 NOTE — DISCHARGE SUMMARY
Discharge Summary    CHIEF COMPLAINT ON ADMISSION  Chief Complaint   Patient presents with    Abdominal Pain     Pt had RLQ pain and went to [facility] [place] where they found a 5mm stone in right kidney, pt denies hx of kidney stones in the past       Reason for Admission  ems     Admission Date  12/20/2022    CODE STATUS  Full Code    HPI & HOSPITAL COURSE  Emma Gonsalez is a 57 y.o. female with history of mechanical aortic valve replacement 2018 on warfarin, hypertension, history of congestive heart failure (EF 20% ->45%) who presented 12/20/2022 with evaluation for abdominal/flank pain where she was found to have a right 5 mm obstructing UVJ stone with right hydronephrosis and suspected ureteral rupture.  Patient was transferred from [facility] ER to [facility] ER for urological evaluation.  Patient reported having excruciating sudden onset right groin pain, radiating to back.     On initial admit she was bridged with heparin. Repeat CT AP/urethrogram noted changes of right hydronephrosis with contrast leakage into perinephric space tracking into right retroperitoneum, likely from anterior interpolar calyx.  Case was discussed with urology. She had urgent cystoscopy and right ureteroscopy with dilation of right ureter and placement of 6-[place]izen of [place] x 24cm right ureteral stent with string by Dr. Oleksandr Whitman. No stone was found, laser lithotripsy unneccessary. She recovered as expected after this procedure and has subsequent resolution of all pain. UA leukocyte esterace negative, WBC 0-2, and bacteria negative. Creatinine resolved from 1.3 on 12/20 to 1.17 on day of discharge. Urology signed off.     Complicating her discharge, she was bridged with heparin drip during her stay while warfarin was held for possible procedures. After her stent was placed warfarin was restarted. Initial plan was to keep her under observation until INR reaches therapeutic levels. She is just passing through town and eager to get back to her established  providers and is stating that she will leave today whether discharged or not, but her INR is 1.6 today. I did explain risks of subtherapeutic anticoagulation with her aortic mechanical valve, and she still is insistent on leaving today.     To accommodate her and optimize her safety as much as possible, she was transitioned to 1mg/kg Lovenox bridge instead of heparin drip and she was provided with a warfarin plan by our pharmacist. She will continue Lovenox until her INR is therapeutic and will follow with her established care team and recheck INR in two days. She already has an appointment at her usual INR lab for recheck. She was able to teachback her plan very well to me and demonstrated proper use of Lovenox injection to me and bedside nurse. I will send her with 7 days of Lovenox 1mg/kg (60mg) BID with instructions to follow urgently with her PCP and INR clinic for further instructions within 2 days. She will also follow closely with a urologist in her home town for followup on her ureteral stent. I also provided her with information on signs of bleeding, UTI, or urinary complications to prompt her to seek immediate care.    Therefore, she is discharged in good and stable condition to home with close outpatient follow-up.    The patient recovered much more quickly than anticipated on admission.    Discharge Date  12/21/2022    FOLLOW UP ITEMS POST DISCHARGE  Take Lovenox every 12 hours as directed  Take Warfarin as directed in pharmacy plan (Warfarin 7 mg M-F and 8 mg Sa/Bello per home regimen with f/u INR in 2-3 days after discharge.)  Recheck INR in 2 days  Follow closely with PCP and Urology. Call PCP asap to notify of this hospitalization and our discharge plan.    DISCHARGE DIAGNOSES  Principal Problem (Resolved):    Obstructive uropathy POA: Unknown  Active Problems:    H/O mechanical aortic valve replacement POA: Unknown    Leukocytosis POA: Unknown    Congestive heart failure (CHF) (HCC) POA: Yes  Resolved  Problems:    Rupture of ureter POA: Yes    RENATA (acute kidney injury) (HCC) POA: Unknown    Sepsis (HCC) POA: Unknown    Pyelonephritis POA: Unknown      FOLLOW UP  PCP    Schedule an appointment as soon as possible for a visit in 2 day(s)  Follow PCP within 2-3 days.   You will need your INR checked in 2-3 days, as soon as possible when you get back home - this will determine how long to continue Lovenox injections..    Urology    Schedule an appointment as soon as possible for a visit in 1 week(s)  Make appointment with a local urologist to follow up on your right ureteral stent placement.    Cardiology    Call in 1 week(s)  Follow with your established cardiologist on your hospitalization within 1-2 weeks      MEDICATIONS ON DISCHARGE     Medication List        START taking these medications        Instructions   enoxaparin 60 MG/0.6ML Sosy inj  Commonly known as: Lovenox   Inject 60 mg under the skin every 12 hours for 7 days. Inject as shown by your nurse. Follow INR with your coumadin clinic as soon as you get home, ideally within 2 days. Continue lovenox until your INR is therapeutic or otherwise instructed by your primary care doctor.  Dose: 1 mg/kg            CONTINUE taking these medications        Instructions   bisoprolol 10 MG tablet  Commonly known as: ZEBETA   Take 10 mg by mouth every day.  Dose: 10 mg     pantoprazole 40 MG Tbec  Commonly known as: PROTONIX   Take 40 mg by mouth every day.  Dose: 40 mg     ramipril 10 MG capsule  Commonly known as: ALTACE   Take 10 mg by mouth 2 times a day.  Dose: 10 mg     rosuvastatin 40 MG tablet  Commonly known as: CRESTOR   Take 40 mg by mouth every day.  Dose: 40 mg     spironolactone 50 MG Tabs  Commonly known as: ALDACTONE   Take 50 mg by mouth every day.  Dose: 50 mg     warfarin 2 MG Tabs  Commonly known as: COUMADIN   Take 7-8 mg by mouth every day. 7 mg on Monday-Friday   8 mg on Saturday and Sunday  Indications: valve replacement  Dose: 7-8 mg               Allergies  No Known Allergies    DIET  Orders Placed This Encounter   Procedures    Diet Order Diet: Cardiac     Standing Status:   Standing     Number of Occurrences:   1     Order Specific Question:   Diet:     Answer:   Cardiac [6]       ACTIVITY  As tolerated. If driving long distance, take frequent breaks to stand & walk.  Weight bearing as tolerated    CONSULTATIONS  Urology    PROCEDURES  12/20/2022: Cystoscopy and right ureteroscopy with dilation of right ureter and placement of 6-[place] x 24cm right ureteral stent with string by Dr. Oleksandr Whitman    LABORATORY  Lab Results   Component Value Date    SODIUM 140 12/21/2022    POTASSIUM 4.0 12/21/2022    CHLORIDE 106 12/21/2022    CO2 24 12/21/2022    GLUCOSE 122 (H) 12/21/2022    BUN 21 12/21/2022    CREATININE 1.17 12/21/2022        Lab Results   Component Value Date    WBC 12.5 (H) 12/21/2022    HEMOGLOBIN 13.4 12/21/2022    HEMATOCRIT 39.4 12/21/2022    PLATELETCT 154 (L) 12/21/2022        Total time of the discharge process exceeds 45 minutes.

## 2022-12-22 LAB
BACTERIA UR CULT: NORMAL
SIGNIFICANT IND 70042: NORMAL
SITE SITE: NORMAL
SOURCE SOURCE: NORMAL

## 2022-12-22 NOTE — ED NOTES
Reviewed chart, labs; blood cultures pending; urine culture -usual uro-genital yary; was discharged 12/21.

## 2022-12-25 LAB
BACTERIA BLD CULT: NORMAL
BACTERIA BLD CULT: NORMAL
SIGNIFICANT IND 70042: NORMAL
SIGNIFICANT IND 70042: NORMAL
SITE SITE: NORMAL
SITE SITE: NORMAL
SOURCE SOURCE: NORMAL
SOURCE SOURCE: NORMAL

## (undated) DEVICE — SET EXTENSION WITH 2 PORTS (48EA/CA) ***PART #2C8610 IS A SUBSTITUTE*****

## (undated) DEVICE — SUCTION INSTRUMENT YANKAUER BULBOUS TIP W/O VENT (50EA/CA)

## (undated) DEVICE — TUBING CLEARLINK DUO-VENT - C-FLO (48EA/CA)

## (undated) DEVICE — SPONGE GAUZESTER 4 X 4 4PLY - (128PK/CA)

## (undated) DEVICE — CONNECTOR HOSE NEPTUNE FOR CYSTO ROOM

## (undated) DEVICE — CANISTER SUCTION 3000ML MECHANICAL FILTER AUTO SHUTOFF MEDI-VAC NONSTERILE LF DISP  (40EA/CA)

## (undated) DEVICE — SLEEVE, VASO, THIGH, MED

## (undated) DEVICE — GOWN WARMING STANDARD FLEX - (30/CA)

## (undated) DEVICE — BASKET ZERO 1.9FR X 120CM

## (undated) DEVICE — LACTATED RINGERS INJ 1000 ML - (14EA/CA 60CA/PF)

## (undated) DEVICE — SODIUM CHL. IRRIGATION 0.9% 3000ML (4EA/CA 65CA/PF)

## (undated) DEVICE — WIRE GUIDE SENSOR DUAL FLEX - 5/BX

## (undated) DEVICE — BAG URODRAIN WITH TUBING - (20/CA)

## (undated) DEVICE — SENSOR OXIMETER ADULT SPO2 RD SET (20EA/BX)

## (undated) DEVICE — COVER FOOT UNIVERSAL DISP. - (25EA/CA)

## (undated) DEVICE — WATER IRRIGATION STERILE 1000ML (12EA/CA)

## (undated) DEVICE — COVER LIGHT HANDLE ALC PLUS DISP (18EA/BX)

## (undated) DEVICE — PACK CYSTO III (2EA/CA)

## (undated) DEVICE — SET LEADWIRE 5 LEAD BEDSIDE DISPOSABLE ECG (1SET OF 5/EA)

## (undated) DEVICE — GLOVE BIOGEL SZ 7.5 SURGICAL PF LTX - (50PR/BX 4BX/CA)

## (undated) DEVICE — CONTAINER SPECIMEN BAG OR - STERILE 4 OZ W/LID (100EA/CA)

## (undated) DEVICE — WATER IRRIG. STER 3000 ML - (4/CA)